# Patient Record
Sex: MALE | Race: WHITE | NOT HISPANIC OR LATINO
[De-identification: names, ages, dates, MRNs, and addresses within clinical notes are randomized per-mention and may not be internally consistent; named-entity substitution may affect disease eponyms.]

---

## 2018-07-07 ENCOUNTER — TRANSCRIPTION ENCOUNTER (OUTPATIENT)
Age: 53
End: 2018-07-07

## 2019-11-07 ENCOUNTER — EMERGENCY (EMERGENCY)
Facility: HOSPITAL | Age: 54
LOS: 0 days | Discharge: HOME | End: 2019-11-07
Attending: EMERGENCY MEDICINE | Admitting: EMERGENCY MEDICINE
Payer: COMMERCIAL

## 2019-11-07 VITALS
SYSTOLIC BLOOD PRESSURE: 136 MMHG | HEART RATE: 68 BPM | OXYGEN SATURATION: 99 % | RESPIRATION RATE: 18 BRPM | TEMPERATURE: 98 F | DIASTOLIC BLOOD PRESSURE: 82 MMHG

## 2019-11-07 VITALS
OXYGEN SATURATION: 98 % | DIASTOLIC BLOOD PRESSURE: 94 MMHG | RESPIRATION RATE: 18 BRPM | HEART RATE: 64 BPM | TEMPERATURE: 97 F | SYSTOLIC BLOOD PRESSURE: 156 MMHG

## 2019-11-07 DIAGNOSIS — R07.9 CHEST PAIN, UNSPECIFIED: ICD-10-CM

## 2019-11-07 DIAGNOSIS — E78.5 HYPERLIPIDEMIA, UNSPECIFIED: ICD-10-CM

## 2019-11-07 DIAGNOSIS — R07.89 OTHER CHEST PAIN: ICD-10-CM

## 2019-11-07 LAB
ALBUMIN SERPL ELPH-MCNC: 4.6 G/DL — SIGNIFICANT CHANGE UP (ref 3.5–5.2)
ALP SERPL-CCNC: 46 U/L — SIGNIFICANT CHANGE UP (ref 30–115)
ALT FLD-CCNC: 36 U/L — SIGNIFICANT CHANGE UP (ref 0–41)
ANION GAP SERPL CALC-SCNC: 11 MMOL/L — SIGNIFICANT CHANGE UP (ref 7–14)
AST SERPL-CCNC: 35 U/L — SIGNIFICANT CHANGE UP (ref 0–41)
BASOPHILS # BLD AUTO: 0.05 K/UL — SIGNIFICANT CHANGE UP (ref 0–0.2)
BASOPHILS NFR BLD AUTO: 0.8 % — SIGNIFICANT CHANGE UP (ref 0–1)
BILIRUB SERPL-MCNC: 0.5 MG/DL — SIGNIFICANT CHANGE UP (ref 0.2–1.2)
BUN SERPL-MCNC: 23 MG/DL — HIGH (ref 10–20)
CALCIUM SERPL-MCNC: 8.8 MG/DL — SIGNIFICANT CHANGE UP (ref 8.5–10.1)
CHLORIDE SERPL-SCNC: 100 MMOL/L — SIGNIFICANT CHANGE UP (ref 98–110)
CO2 SERPL-SCNC: 26 MMOL/L — SIGNIFICANT CHANGE UP (ref 17–32)
CREAT SERPL-MCNC: 1 MG/DL — SIGNIFICANT CHANGE UP (ref 0.7–1.5)
EOSINOPHIL # BLD AUTO: 0.09 K/UL — SIGNIFICANT CHANGE UP (ref 0–0.7)
EOSINOPHIL NFR BLD AUTO: 1.4 % — SIGNIFICANT CHANGE UP (ref 0–8)
GLUCOSE SERPL-MCNC: 100 MG/DL — HIGH (ref 70–99)
HCT VFR BLD CALC: 43.3 % — SIGNIFICANT CHANGE UP (ref 42–52)
HGB BLD-MCNC: 14.6 G/DL — SIGNIFICANT CHANGE UP (ref 14–18)
IMM GRANULOCYTES NFR BLD AUTO: 0.3 % — SIGNIFICANT CHANGE UP (ref 0.1–0.3)
LYMPHOCYTES # BLD AUTO: 1.8 K/UL — SIGNIFICANT CHANGE UP (ref 1.2–3.4)
LYMPHOCYTES # BLD AUTO: 28.8 % — SIGNIFICANT CHANGE UP (ref 20.5–51.1)
MCHC RBC-ENTMCNC: 27.7 PG — SIGNIFICANT CHANGE UP (ref 27–31)
MCHC RBC-ENTMCNC: 33.7 G/DL — SIGNIFICANT CHANGE UP (ref 32–37)
MCV RBC AUTO: 82 FL — SIGNIFICANT CHANGE UP (ref 80–94)
MONOCYTES # BLD AUTO: 0.6 K/UL — SIGNIFICANT CHANGE UP (ref 0.1–0.6)
MONOCYTES NFR BLD AUTO: 9.6 % — HIGH (ref 1.7–9.3)
NEUTROPHILS # BLD AUTO: 3.68 K/UL — SIGNIFICANT CHANGE UP (ref 1.4–6.5)
NEUTROPHILS NFR BLD AUTO: 59.1 % — SIGNIFICANT CHANGE UP (ref 42.2–75.2)
NRBC # BLD: 0 /100 WBCS — SIGNIFICANT CHANGE UP (ref 0–0)
PLATELET # BLD AUTO: 250 K/UL — SIGNIFICANT CHANGE UP (ref 130–400)
POTASSIUM SERPL-MCNC: 4.5 MMOL/L — SIGNIFICANT CHANGE UP (ref 3.5–5)
POTASSIUM SERPL-SCNC: 4.5 MMOL/L — SIGNIFICANT CHANGE UP (ref 3.5–5)
PROT SERPL-MCNC: 7.5 G/DL — SIGNIFICANT CHANGE UP (ref 6–8)
RBC # BLD: 5.28 M/UL — SIGNIFICANT CHANGE UP (ref 4.7–6.1)
RBC # FLD: 12.2 % — SIGNIFICANT CHANGE UP (ref 11.5–14.5)
SODIUM SERPL-SCNC: 137 MMOL/L — SIGNIFICANT CHANGE UP (ref 135–146)
TROPONIN T SERPL-MCNC: <0.01 NG/ML — SIGNIFICANT CHANGE UP
TROPONIN T SERPL-MCNC: <0.01 NG/ML — SIGNIFICANT CHANGE UP
WBC # BLD: 6.24 K/UL — SIGNIFICANT CHANGE UP (ref 4.8–10.8)
WBC # FLD AUTO: 6.24 K/UL — SIGNIFICANT CHANGE UP (ref 4.8–10.8)

## 2019-11-07 PROCEDURE — 99285 EMERGENCY DEPT VISIT HI MDM: CPT

## 2019-11-07 PROCEDURE — 71046 X-RAY EXAM CHEST 2 VIEWS: CPT | Mod: 26

## 2019-11-07 NOTE — ED PROVIDER NOTE - NS ED ROS FT
Constitutional: (-) fever, (-) chills  Eyes: (-) visual changes  ENT: (-) nasal congestions  Cardiovascular: (+) chest pain, (-) syncope  Respiratory: (-) cough, (-) shortness of breath, (-) dyspnea,   Gastrointestinal: (-) vomiting, (-) diarrhea, (-)nausea,  Musculoskeletal: (-) neck pain, (-) back pain, (-) joint pain,  Integumentary: (-) rash, (-) edema, (-) bruises  Neurological: (-) headache, (-) loc, (-) dizziness, (-) tingling, (-)numbness,  Peripheral Vascular: (-) leg swelling  :  (-)dysuria,  (-) hematuria  Allergic/Immunologic: (-) pruritus

## 2019-11-07 NOTE — ED PROVIDER NOTE - OBJECTIVE STATEMENT
53 yo male, pmh of hld, and ccta showed coronary plaque with followed by cards, presents to ed for cp. Midsternal, started three days ago, states pain comes and goes, mild, aching, no otc meds for pain. Denies fever, chills, sob, le swelling, recent travel, recent illness, nv, numbness, tingling. 55 yo male, pmh of hld, and ccta showed coronary plaque with followed by maureen Thomas less than 1 year ago, presents to ed for cp. Midsternal, started three days ago, states pain comes and goes, mild, aching, no otc meds for pain. Denies fever, chills, sob, le swelling, recent travel, recent illness, nv, numbness, tingling.

## 2019-11-07 NOTE — ED ADULT NURSE NOTE - NSFALLRSKASSESASSIST_ED_ALL_ED
Xanax prn, low dose no 

## 2019-11-07 NOTE — ED ADULT NURSE NOTE - CHPI ED NUR SYMPTOMS NEG
no vomiting/no congestion/no dizziness/no nausea/no syncope/no shortness of breath/no chills/no back pain/no diaphoresis/no fever

## 2019-11-07 NOTE — ED PROVIDER NOTE - CARE PROVIDER_API CALL
Bhanu Swift)  Cardiology; Interventional Cardiology  [address], [address]  [place] [address]  Phone: [phone]  Fax: [phone]  Follow Up Time: 1-3 Days

## 2019-11-07 NOTE — ED PROVIDER NOTE - CLINICAL SUMMARY MEDICAL DECISION MAKING FREE TEXT BOX
Pt with CP, PE as above, labs and studies reviewed and d/w Dr Swift, will d/c to f/u as outpatient. Patient counseled regarding conditions which should prompt return.

## 2019-11-07 NOTE — ED PROVIDER NOTE - ATTENDING CONTRIBUTION TO CARE
54y male nonsmoker with 3d intermittent epigastric/right CP, nonexertional, no sob, lasting 40-50 seconds on and off during the day, on exam vital signs appreciated, well appearing, head nc/at, perrla, EOMI, conj pink op clear neck supple cor rrr lungs cta abd snt no c/c/e pulses equal calves nontender neuro intact, will check cxr, ekg, labs, d/w Duvvuri

## 2019-11-07 NOTE — ED PROVIDER NOTE - PHYSICAL EXAMINATION
Physical Exam    Vital Signs: I have reviewed the initial vital signs.  Constitutional: well-nourished, appears stated age, no acute distress  Eyes: Conjunctiva pink, Sclera clear  Cardiovascular: S1 and S2, regular rate, regular rhythm, well-perfused extremities, radial pulses equal and 2+, pedal pulses 2+ and equal.  Respiratory: unlabored respiratory effort, clear to auscultation bilaterally no wheezing, rales and rhonchi  Gastrointestinal: soft, non-tender abdomen, no pulsatile mass, normal bowl sounds  Musculoskeletal: supple neck, no lower extremity edema, no midline tenderness  Integumentary: warm, dry, no rash  Neurologic: awake, alert, nvi, extremities’ motor and sensory functions grossly intact, normal gait.

## 2019-11-07 NOTE — ED PROVIDER NOTE - PATIENT PORTAL LINK FT
You can access the FollowMyHealth Patient Portal offered by [facility] by registering at the following website: [email or web address]. By joining [employer]’s FollowMyHealth portal, you will also be able to view your health information using other applications (apps) compatible with our system.

## 2019-12-09 ENCOUNTER — EMERGENCY (EMERGENCY)
Facility: HOSPITAL | Age: 54
LOS: 0 days | Discharge: HOME | End: 2019-12-09
Attending: EMERGENCY MEDICINE | Admitting: EMERGENCY MEDICINE
Payer: COMMERCIAL

## 2019-12-09 VITALS
OXYGEN SATURATION: 99 % | TEMPERATURE: 99 F | SYSTOLIC BLOOD PRESSURE: 165 MMHG | HEART RATE: 69 BPM | RESPIRATION RATE: 18 BRPM | DIASTOLIC BLOOD PRESSURE: 82 MMHG

## 2019-12-09 DIAGNOSIS — N50.819 TESTICULAR PAIN, UNSPECIFIED: ICD-10-CM

## 2019-12-09 DIAGNOSIS — N50.3 CYST OF EPIDIDYMIS: ICD-10-CM

## 2019-12-09 DIAGNOSIS — R31.9 HEMATURIA, UNSPECIFIED: ICD-10-CM

## 2019-12-09 PROBLEM — E78.5 HYPERLIPIDEMIA, UNSPECIFIED: Chronic | Status: ACTIVE | Noted: 2019-11-07

## 2019-12-09 LAB
APPEARANCE UR: CLEAR — SIGNIFICANT CHANGE UP
BILIRUB UR-MCNC: NEGATIVE — SIGNIFICANT CHANGE UP
COLOR SPEC: SIGNIFICANT CHANGE UP
DIFF PNL FLD: SIGNIFICANT CHANGE UP
GLUCOSE UR QL: NEGATIVE — SIGNIFICANT CHANGE UP
KETONES UR-MCNC: NEGATIVE — SIGNIFICANT CHANGE UP
LEUKOCYTE ESTERASE UR-ACNC: NEGATIVE — SIGNIFICANT CHANGE UP
NITRITE UR-MCNC: NEGATIVE — SIGNIFICANT CHANGE UP
PH UR: 6.5 — SIGNIFICANT CHANGE UP (ref 5–8)
PROT UR-MCNC: NEGATIVE — SIGNIFICANT CHANGE UP
SP GR SPEC: 1.02 — SIGNIFICANT CHANGE UP (ref 1.01–1.02)
UROBILINOGEN FLD QL: SIGNIFICANT CHANGE UP

## 2019-12-09 PROCEDURE — 99284 EMERGENCY DEPT VISIT MOD MDM: CPT

## 2019-12-09 PROCEDURE — 76870 US EXAM SCROTUM: CPT | Mod: 26

## 2019-12-09 NOTE — ED PROVIDER NOTE - OBJECTIVE STATEMENT
54 y.o male w/ hx of HLD, CKD presents to the ED for evaluation of right testicular pain x 3 weeks.  For past 3 weeks intermittent R testicular pain, achy, no alleviating/ exacerbating factors, no radiation of pain, mild severity.  Denies diarrhea, constipation, urinary sxs, fever, chills, back pain. sexually active with one partner.  no concern for STIs.

## 2019-12-09 NOTE — ED PROVIDER NOTE - CARE PROVIDERS DIRECT ADDRESSES
[email or web address]

## 2019-12-09 NOTE — ED PROVIDER NOTE - CARE PLAN
Principal Discharge DX:	Epididymal cyst Principal Discharge DX:	Epididymal cyst  Secondary Diagnosis:	Hematuria

## 2019-12-09 NOTE — ED PROVIDER NOTE - NSFOLLOWUPINSTRUCTIONS_ED_ALL_ED_FT
Spermatocele    A spermatocele is a fluid-filled sac (cyst) inside the scrotum. This type of cyst often forms at the top of the testicle where sperm is stored (epididymis). The cyst sometimes forms along the tube that carries sperm away from the epididymis (vas deferens).     Spermatoceles are usually painless. Most cysts are small, but they can grow larger. Spermatoceles are not cancerous (are benign).     CAUSES  The cause of this condition is not known.    SYMPTOMS  In most cases, small cysts do not cause symptoms. If you do have symptoms, they may include:    Dull pain.  A feeling of heaviness.  An enlargement of your scrotum, if the cyst is large.    DIAGNOSIS  This condition is diagnosed based on a physical exam. You or your health care provider may notice the cyst when feeling your scrotum. Your provider may shine a light through (transilluminate) your scrotum to see if light will pass through the cyst. You may also have an ultrasound of your scrotum to rule out a tumor.    TREATMENT  Small spermatoceles do not need to be treated. If the spermatocele has grown large or is uncomfortable, surgery to remove the cyst may be recommended.    HOME CARE INSTRUCTIONS  Watch your spermatocele for any changes.  Keep all follow-up visits as told by your health care provider. This is important.    SEEK MEDICAL CARE IF:  Your spermatocele gets larger.  You have pain in your scrotum.  Your spermatocele comes back after treatment.    ADDITIONAL NOTES AND INSTRUCTIONS    Please follow up with your Primary MD in 24-48 hr.  Seek immediate medical care for any new/worsening signs or symptoms.     Follow up with your primary medical doctor in 1-2 days

## 2019-12-09 NOTE — ED PROVIDER NOTE - CLINICAL SUMMARY MEDICAL DECISION MAKING FREE TEXT BOX
pt with intemittent testicular pain, sono done showing cyst. no torsion or epidymititis, pt comftorable with outpt urology f/u. pt also with trace blood in urine. pt aware of need for f/u with this.

## 2019-12-09 NOTE — ED ADULT NURSE NOTE - NSIMPLEMENTINTERV_GEN_ALL_ED
Implemented All Universal Safety Interventions:  [place] to call system. Call bell, personal items and telephone within reach. Instruct patient to call for assistance. Room bathroom lighting operational. Non-slip footwear when patient is off stretcher. Physically safe environment: no spills, clutter or unnecessary equipment. Stretcher in lowest position, wheels locked, appropriate side rails in place.

## 2019-12-09 NOTE — ED PROVIDER NOTE - PHYSICAL EXAMINATION
CONST: Well appearing in NAD  EYES: Sclera and conjunctiva clear.  CARD: Normal S1 S2; Normal rate and rhythm  RESP: Equal BS B/L, No wheezes, rhonchi or rales. No distress  GI: Soft, non-tender, non-distended.  : + mild TTP right epididymis, no penile discharge, normal testicular lie, normal cremasteric reflex, no direct/indirect hernias, no inguinal tenderness  MS: Normal ROM in all extremities. No edema of lower extremities, no calf pain, radial pulses 2+ bilaterally  SKIN: Warm, dry, no acute rashes. Good turgor  NEURO: A&Ox3, No focal deficits. Strength 5/5 with no sensory deficits. Steady gait

## 2019-12-09 NOTE — ED ADULT NURSE NOTE - OBJECTIVE STATEMENT
patient alert and oriented x4, complaining of testicular pain for three weeks. denies swelling in testicles, fever, nausea, vomiting, diarrhea.

## 2019-12-09 NOTE — ED PROVIDER NOTE - CARE PROVIDER_API CALL
Caesar Gates)  Urology  [address] [place] [address]  Phone: [phone]  Fax: [phone]  Follow Up Time: 1-3 Days

## 2019-12-09 NOTE — ED PROVIDER NOTE - PATIENT PORTAL LINK FT
You can access the FollowMyHealth Patient Portal offered by [facility] by registering at the following website: [email or web address]. By joining [employer]’s FollowMyHealth portal, you will also be able to view your health information using other applications (apps) compatible with our system.

## 2019-12-09 NOTE — ED PROVIDER NOTE - NS ED ROS FT
Constitutional: See HPI.  Eyes: No visual changes, eye pain or discharge.   ENMT: No hearing changes, pain, discharge or infections.   Cardiac: No SOB or edema. No chest pain with exertion.  Respiratory: No cough or respiratory distress.   GI: No nausea, vomiting, diarrhea or abdominal pain.  : + testicular pain. No dysuria, frequency or burning. No Discharge  MS: No myalgia, muscle weakness, joint pain or back pain.  Neuro: No headache or weakness.   Skin: No skin rash.  Except as documented in the HPI, all other systems are negative.

## 2019-12-09 NOTE — ED PROVIDER NOTE - PROGRESS NOTE DETAILS
Attending Note: I personally evaluated the patient. I reviewed the Resident’s note (as assigned above), and agree with the findings and plan except as documented in my note. 55 y/o M presents for intermittent testicular pain. Pt has pain to his left testicle intermittently that is not associated with dysuria or discharge. Pt states he is only sexually active with his wife. Pt is also c/o right inguinal pain with no radiation. No fever or chills. PE: Well appearing, awake and alert. Cardio – S1S2. Resp - CTAB. Abdomen – soft, NTND. : B/l cremasteric reflex. Left: tenderness over epididymitis. No inguinal hernia appreciated.  Ext- no calf swelling. Neuro – grossly unremarkable Discussed results with pt.  All questions were answered and return precautions discussed.  Pt is asx and comfortable at this time.  Unremarkable re-exam.  No further concerns at this time from pt.  Will follow up with PMD.  Pt understands and agrees with tx plan.  discussed incidental findings. Attending Note: I personally evaluated the patient. I reviewed the Resident’s note (as assigned above), and agree with the findings and plan except as documented in my note. 55 y/o M presents for intermittent testicular pain. Pt has pain to his left testicle intermittently that is not associated with dysuria or discharge. Pt states he is only sexually active with his wife. Pt is also c/o right inguinal pain with no radiation. No fever or chills. PE: Well appearing, awake and alert. Cardio – S1S2. Resp - CTAB. Abdomen – soft, NTND. : B/l cremasteric reflex. Left: tenderness over epididymitis. No inguinal hernia appreciated BL.  Ext- no calf swelling. Neuro – grossly unremarkable Discussed results with pt.  All questions were answered and return precautions discussed.  Pt is asx and comfortable at this time.  Unremarkable re-exam.  No further concerns at this time from pt.  Will follow up with PMD and urology.  Pt understands and agrees with tx plan.  discussed incidental findings.

## 2019-12-10 ENCOUNTER — APPOINTMENT (OUTPATIENT)
Dept: UROLOGY | Facility: CLINIC | Age: 54
End: 2019-12-10
Payer: COMMERCIAL

## 2019-12-10 DIAGNOSIS — Z78.9 OTHER SPECIFIED HEALTH STATUS: ICD-10-CM

## 2019-12-10 DIAGNOSIS — Z86.79 PERSONAL HISTORY OF OTHER DISEASES OF THE CIRCULATORY SYSTEM: ICD-10-CM

## 2019-12-10 DIAGNOSIS — N44.2 BENIGN CYST OF TESTIS: ICD-10-CM

## 2019-12-10 DIAGNOSIS — Z86.39 PERSONAL HISTORY OF OTHER ENDOCRINE, NUTRITIONAL AND METABOLIC DISEASE: ICD-10-CM

## 2019-12-10 LAB
C TRACH RRNA SPEC QL NAA+PROBE: SIGNIFICANT CHANGE UP
N GONORRHOEA RRNA SPEC QL NAA+PROBE: SIGNIFICANT CHANGE UP
SPECIMEN SOURCE: SIGNIFICANT CHANGE UP

## 2019-12-10 PROCEDURE — 99204 OFFICE O/P NEW MOD 45 MIN: CPT

## 2019-12-10 NOTE — ASSESSMENT
[FreeTextEntry1] : #1. BPH, clinically\par #2. Left epididymal cyst == based on ultrasound imaging and physical exam\par \par Plan\par -PSA now\par -Annual STEVO and PSA

## 2019-12-10 NOTE — PHYSICAL EXAM
[General Appearance - Well Nourished] : well nourished [General Appearance - Well Developed] : well developed [Well Groomed] : well groomed [Normal Appearance] : normal appearance [General Appearance - In No Acute Distress] : no acute distress [Respiration, Rhythm And Depth] : normal respiratory rhythm and effort [Edema] : no peripheral edema [Abdomen Tenderness] : non-tender [Exaggerated Use Of Accessory Muscles For Inspiration] : no accessory muscle use [Abdomen Soft] : soft [Urethral Meatus] : meatus normal [Penis Abnormality] : normal circumcised penis [Costovertebral Angle Tenderness] : no ~M costovertebral angle tenderness [Testes Tenderness] : no tenderness of the testes [Scrotum] : the scrotum was normal [Testes Mass (___cm)] : there were no testicular masses [Prostate Tenderness] : the prostate was not tender [No Prostate Nodules] : no prostate nodules [Prostate Size ___ gm] : prostate size [unfilled] gm [Normal Station and Gait] : the gait and station were normal for the patient's age [] : no rash [Oriented To Time, Place, And Person] : oriented to person, place, and time [Affect] : the affect was normal [No Focal Deficits] : no focal deficits [Not Anxious] : not anxious [No Palpable Adenopathy] : no palpable adenopathy [Mood] : the mood was normal [FreeTextEntry1] : sulcus ++ // small mass above left  testis , sl. tender

## 2019-12-10 NOTE — HISTORY OF PRESENT ILLNESS
[FreeTextEntry1] : 54-year-old male here for initial consultation regarding left epididymal cyst. Patient reported feeling a small tender mass in his left testicle yesterday and therefore went to emergency room. Testicular ultrasound suggested a left epididymal cyst. Patient here for consultation following that visit.\par Urinalysis today== negative [Urinary Urgency] : urinary urgency [Urinary Frequency] : urinary frequency [Nocturia] : nocturia [Straining] : no straining [Weak Stream] : no weak stream [Dysuria] : no dysuria [Fatigue] : no fatigue [Hematuria - Gross] : no gross hematuria [Anorexia] : no anorexia

## 2019-12-15 ENCOUNTER — TRANSCRIPTION ENCOUNTER (OUTPATIENT)
Age: 54
End: 2019-12-15

## 2020-02-11 ENCOUNTER — TRANSCRIPTION ENCOUNTER (OUTPATIENT)
Age: 55
End: 2020-02-11

## 2020-12-10 ENCOUNTER — APPOINTMENT (OUTPATIENT)
Dept: UROLOGY | Facility: CLINIC | Age: 55
End: 2020-12-10
Payer: COMMERCIAL

## 2020-12-10 VITALS — WEIGHT: 170 LBS | TEMPERATURE: 98.6 F | HEIGHT: 68 IN | BODY MASS INDEX: 25.76 KG/M2

## 2020-12-10 LAB
BILIRUB UR QL STRIP: NORMAL
CLARITY UR: CLEAR
COLLECTION METHOD: NORMAL
GLUCOSE UR-MCNC: NORMAL
HCG UR QL: NORMAL EU/DL
HGB UR QL STRIP.AUTO: NORMAL
KETONES UR-MCNC: NORMAL
LEUKOCYTE ESTERASE UR QL STRIP: NORMAL
NITRITE UR QL STRIP: NORMAL
PH UR STRIP: 6
PROT UR STRIP-MCNC: NORMAL
SP GR UR STRIP: 1.01

## 2020-12-10 PROCEDURE — 99072 ADDL SUPL MATRL&STAF TM PHE: CPT

## 2020-12-10 PROCEDURE — 81003 URINALYSIS AUTO W/O SCOPE: CPT | Mod: QW

## 2020-12-10 PROCEDURE — 99214 OFFICE O/P EST MOD 30 MIN: CPT

## 2020-12-10 NOTE — HISTORY OF PRESENT ILLNESS
[Urinary Urgency] : urinary urgency [Urinary Frequency] : urinary frequency [Nocturia] : nocturia [FreeTextEntry1] : 55 year-old male here  regarding left epididymal cyst. Patient reported feeling a small  mass in his left testicle  now painless. Testicular ultrasound suggested a left epididymal cyst. Patient here for consultation following that visit.\par Urinalysis today== negative\par \par 8/20 psa = .5  // bun = 15  creat = 1.0 [Straining] : no straining [Weak Stream] : no weak stream [Dysuria] : no dysuria [Hematuria - Gross] : no gross hematuria [Fatigue] : no fatigue [Anorexia] : no anorexia

## 2020-12-10 NOTE — ASSESSMENT
[FreeTextEntry1] : #1. BPH, clinically\par #2. Left epididymal cyst == based on ultrasound imaging and physical exam\par \par Plan\par -Annual STEVO and PSA

## 2020-12-10 NOTE — PHYSICAL EXAM
[General Appearance - Well Developed] : well developed [General Appearance - Well Nourished] : well nourished [Normal Appearance] : normal appearance [Well Groomed] : well groomed [General Appearance - In No Acute Distress] : no acute distress [Abdomen Soft] : soft [Abdomen Tenderness] : non-tender [Costovertebral Angle Tenderness] : no ~M costovertebral angle tenderness [Urethral Meatus] : meatus normal [Penis Abnormality] : normal circumcised penis [Scrotum] : the scrotum was normal [Testes Tenderness] : no tenderness of the testes [Testes Mass (___cm)] : there were no testicular masses [Prostate Tenderness] : the prostate was not tender [No Prostate Nodules] : no prostate nodules [Prostate Size ___ gm] : prostate size [unfilled] gm [Edema] : no peripheral edema [] : no respiratory distress [Respiration, Rhythm And Depth] : normal respiratory rhythm and effort [Exaggerated Use Of Accessory Muscles For Inspiration] : no accessory muscle use [Oriented To Time, Place, And Person] : oriented to person, place, and time [Affect] : the affect was normal [Mood] : the mood was normal [Not Anxious] : not anxious [Normal Station and Gait] : the gait and station were normal for the patient's age [No Focal Deficits] : no focal deficits [No Palpable Adenopathy] : no palpable adenopathy [FreeTextEntry1] : sulcus ++ // small mass above left  testis

## 2021-02-24 ENCOUNTER — TRANSCRIPTION ENCOUNTER (OUTPATIENT)
Age: 56
End: 2021-02-24

## 2021-02-24 ENCOUNTER — EMERGENCY (EMERGENCY)
Facility: HOSPITAL | Age: 56
LOS: 0 days | Discharge: HOME | End: 2021-02-25
Attending: EMERGENCY MEDICINE | Admitting: EMERGENCY MEDICINE
Payer: COMMERCIAL

## 2021-02-24 VITALS
SYSTOLIC BLOOD PRESSURE: 166 MMHG | HEIGHT: 68 IN | HEART RATE: 63 BPM | WEIGHT: 169.98 LBS | RESPIRATION RATE: 18 BRPM | TEMPERATURE: 98 F | DIASTOLIC BLOOD PRESSURE: 93 MMHG | OXYGEN SATURATION: 100 %

## 2021-02-24 DIAGNOSIS — I25.10 ATHEROSCLEROTIC HEART DISEASE OF NATIVE CORONARY ARTERY WITHOUT ANGINA PECTORIS: ICD-10-CM

## 2021-02-24 DIAGNOSIS — R07.9 CHEST PAIN, UNSPECIFIED: ICD-10-CM

## 2021-02-24 DIAGNOSIS — Z20.822 CONTACT WITH AND (SUSPECTED) EXPOSURE TO COVID-19: ICD-10-CM

## 2021-02-24 DIAGNOSIS — R91.1 SOLITARY PULMONARY NODULE: ICD-10-CM

## 2021-02-24 DIAGNOSIS — R07.89 OTHER CHEST PAIN: ICD-10-CM

## 2021-02-24 PROCEDURE — 93010 ELECTROCARDIOGRAM REPORT: CPT

## 2021-02-24 PROCEDURE — 99285 EMERGENCY DEPT VISIT HI MDM: CPT

## 2021-02-24 RX ORDER — ASPIRIN/CALCIUM CARB/MAGNESIUM 324 MG
162 TABLET ORAL ONCE
Refills: 0 | Status: COMPLETED | OUTPATIENT
Start: 2021-02-24 | End: 2021-02-24

## 2021-02-24 RX ADMIN — Medication 162 MILLIGRAM(S): at 23:06

## 2021-02-24 NOTE — ED ADULT NURSE NOTE - OBJECTIVE STATEMENT
pt aox4 complaining of chest pain for the past few days. ekg done, iv placed / labs sent. VSS. Will continue to monitor / assess

## 2021-02-24 NOTE — ED ADULT NURSE NOTE - NSIMPLEMENTINTERV_GEN_ALL_ED
Implemented All Universal Safety Interventions:  [place] to call system. Call bell, personal items and telephone within reach. Instruct patient to call for assistance. Room bathroom lighting operational. Non-slip footwear when patient is off stretcher. Physically safe environment: no spills, clutter or unnecessary equipment. Stretcher in lowest position, wheels locked, appropriate side rails in place.

## 2021-02-25 ENCOUNTER — APPOINTMENT (OUTPATIENT)
Dept: SURGERY | Facility: CLINIC | Age: 56
End: 2021-02-25

## 2021-02-25 VITALS — OXYGEN SATURATION: 100 % | TEMPERATURE: 98 F | RESPIRATION RATE: 18 BRPM | HEART RATE: 60 BPM

## 2021-02-25 LAB
ALBUMIN SERPL ELPH-MCNC: 4.8 G/DL — SIGNIFICANT CHANGE UP (ref 3.5–5.2)
ALP SERPL-CCNC: 53 U/L — SIGNIFICANT CHANGE UP (ref 30–115)
ALT FLD-CCNC: 21 U/L — SIGNIFICANT CHANGE UP (ref 0–41)
ANION GAP SERPL CALC-SCNC: 12 MMOL/L — SIGNIFICANT CHANGE UP (ref 7–14)
AST SERPL-CCNC: 21 U/L — SIGNIFICANT CHANGE UP (ref 0–41)
BASOPHILS # BLD AUTO: 0.04 K/UL — SIGNIFICANT CHANGE UP (ref 0–0.2)
BASOPHILS NFR BLD AUTO: 0.4 % — SIGNIFICANT CHANGE UP (ref 0–1)
BILIRUB SERPL-MCNC: 0.5 MG/DL — SIGNIFICANT CHANGE UP (ref 0.2–1.2)
BUN SERPL-MCNC: 22 MG/DL — HIGH (ref 10–20)
CALCIUM SERPL-MCNC: 9.4 MG/DL — SIGNIFICANT CHANGE UP (ref 8.5–10.1)
CHLORIDE SERPL-SCNC: 102 MMOL/L — SIGNIFICANT CHANGE UP (ref 98–110)
CO2 SERPL-SCNC: 24 MMOL/L — SIGNIFICANT CHANGE UP (ref 17–32)
CREAT SERPL-MCNC: 1 MG/DL — SIGNIFICANT CHANGE UP (ref 0.7–1.5)
EOSINOPHIL # BLD AUTO: 0.06 K/UL — SIGNIFICANT CHANGE UP (ref 0–0.7)
EOSINOPHIL NFR BLD AUTO: 0.7 % — SIGNIFICANT CHANGE UP (ref 0–8)
GLUCOSE SERPL-MCNC: 113 MG/DL — HIGH (ref 70–99)
HCT VFR BLD CALC: 44.9 % — SIGNIFICANT CHANGE UP (ref 42–52)
HGB BLD-MCNC: 15.8 G/DL — SIGNIFICANT CHANGE UP (ref 14–18)
IMM GRANULOCYTES NFR BLD AUTO: 0.3 % — SIGNIFICANT CHANGE UP (ref 0.1–0.3)
LYMPHOCYTES # BLD AUTO: 1.7 K/UL — SIGNIFICANT CHANGE UP (ref 1.2–3.4)
LYMPHOCYTES # BLD AUTO: 19.1 % — LOW (ref 20.5–51.1)
MAGNESIUM SERPL-MCNC: 2.3 MG/DL — SIGNIFICANT CHANGE UP (ref 1.8–2.4)
MCHC RBC-ENTMCNC: 28.8 PG — SIGNIFICANT CHANGE UP (ref 27–31)
MCHC RBC-ENTMCNC: 35.2 G/DL — SIGNIFICANT CHANGE UP (ref 32–37)
MCV RBC AUTO: 81.9 FL — SIGNIFICANT CHANGE UP (ref 80–94)
MONOCYTES # BLD AUTO: 0.65 K/UL — HIGH (ref 0.1–0.6)
MONOCYTES NFR BLD AUTO: 7.3 % — SIGNIFICANT CHANGE UP (ref 1.7–9.3)
NEUTROPHILS # BLD AUTO: 6.42 K/UL — SIGNIFICANT CHANGE UP (ref 1.4–6.5)
NEUTROPHILS NFR BLD AUTO: 72.2 % — SIGNIFICANT CHANGE UP (ref 42.2–75.2)
NRBC # BLD: 0 /100 WBCS — SIGNIFICANT CHANGE UP (ref 0–0)
PLATELET # BLD AUTO: 249 K/UL — SIGNIFICANT CHANGE UP (ref 130–400)
POTASSIUM SERPL-MCNC: 4.4 MMOL/L — SIGNIFICANT CHANGE UP (ref 3.5–5)
POTASSIUM SERPL-SCNC: 4.4 MMOL/L — SIGNIFICANT CHANGE UP (ref 3.5–5)
PROT SERPL-MCNC: 7.8 G/DL — SIGNIFICANT CHANGE UP (ref 6–8)
RAPID RVP RESULT: SIGNIFICANT CHANGE UP
RBC # BLD: 5.48 M/UL — SIGNIFICANT CHANGE UP (ref 4.7–6.1)
RBC # FLD: 12.8 % — SIGNIFICANT CHANGE UP (ref 11.5–14.5)
SARS-COV-2 RNA SPEC QL NAA+PROBE: SIGNIFICANT CHANGE UP
SODIUM SERPL-SCNC: 138 MMOL/L — SIGNIFICANT CHANGE UP (ref 135–146)
TROPONIN T SERPL-MCNC: <0.01 NG/ML — SIGNIFICANT CHANGE UP
TROPONIN T SERPL-MCNC: <0.01 NG/ML — SIGNIFICANT CHANGE UP
WBC # BLD: 8.9 K/UL — SIGNIFICANT CHANGE UP (ref 4.8–10.8)
WBC # FLD AUTO: 8.9 K/UL — SIGNIFICANT CHANGE UP (ref 4.8–10.8)

## 2021-02-25 PROCEDURE — 99220: CPT

## 2021-02-25 PROCEDURE — 76705 ECHO EXAM OF ABDOMEN: CPT | Mod: 26

## 2021-02-25 PROCEDURE — 75574 CT ANGIO HRT W/3D IMAGE: CPT | Mod: 26

## 2021-02-25 PROCEDURE — 93010 ELECTROCARDIOGRAM REPORT: CPT

## 2021-02-25 PROCEDURE — 71046 X-RAY EXAM CHEST 2 VIEWS: CPT | Mod: 26

## 2021-02-25 RX ORDER — METOPROLOL TARTRATE 50 MG
5 TABLET ORAL ONCE
Refills: 0 | Status: DISCONTINUED | OUTPATIENT
Start: 2021-02-25 | End: 2021-02-25

## 2021-02-25 NOTE — ED CDU PROVIDER DISPOSITION NOTE - CARE PROVIDER_API CALL
Bhanu Swift  CARDIOLOGY  [address], [address]  [place] [address]  Phone: [phone]  Fax: [phone]  Established Patient  Follow Up Time: 7-10 Days

## 2021-02-25 NOTE — ED CDU PROVIDER INITIAL DAY NOTE - PHYSICAL EXAMINATION
Physical Exam    Vital Signs: I have reviewed the initial vital signs.  Constitutional: well-nourished, appears stated age, no acute distress  Eyes: Conjunctiva pink, Sclera clear  Cardiovascular: S1 and S2, regular rate, regular rhythm, well-perfused extremities, radial pulses equal and 2+ b/l.   Respiratory: unlabored respiratory effort, clear to auscultation bilaterally no wheezing, rales and rhonchi. pt is speaking full sentences. no accessory muscle use. no reproducible chest tenderness or chest wall crepitus.    Gastrointestinal: soft, non-tender, nondistended abdomen, no pulsatile mass, normal bowl sounds, no rebound, no guarding  Musculoskeletal: supple neck, no lower extremity edema, no calf tenderness  Integumentary: warm, dry, no rash  Neurologic: awake, alert  Psychiatric: appropriate mood, appropriate affect

## 2021-02-25 NOTE — ED PROVIDER NOTE - OBJECTIVE STATEMENT
56 y/o male with a PMH of HTN and HLD (on amlodipine 5mg, rosuvastatin 10mg every other day, and baby aspirin every other day) presents to the ED for evaluation of intermittent nonradiating right side chest pain x 3 days. pt reports when he experiences the pain is lasts a couple of seconds. pt denies alleviating or worsening factors. pt reports he follows cardiologist Dr. Ibanez and was seen about 1 and a half months ago. pt reports drinking alcohol frequently. pt denies hx of mi or blood clot, fhx of mi, illicit drug use, cigarette smoking, fever, cough, recent viral illness, palpitations, rashes, abdominal pain, back pain, n/v/d/c, back pain, sweating, leg pain, leg swelling, recent trauma, recent travel, pleuritic chest pain, numbness, tingling,or dizziness.

## 2021-02-25 NOTE — ED PROVIDER NOTE - CARE PLAN
Principal Discharge DX:	Chest pain   Principal Discharge DX:	Chest pain  Secondary Diagnosis:	Coronary artery disease  Secondary Diagnosis:	Pulmonary nodule

## 2021-02-25 NOTE — ED CDU PROVIDER DISPOSITION NOTE - PATIENT PORTAL LINK FT
You can access the FollowMyHealth Patient Portal offered by [facility] by registering at the following website: [email or web address]. By joining [employer]’s FollowMyHealth portal, you will also be able to view your health information using other applications (apps) compatible with our system.

## 2021-02-25 NOTE — ED PROVIDER NOTE - ATTENDING CONTRIBUTION TO CARE
I personally evaluated the patient. I reviewed the Resident’s or Physician Assistant’s note (as assigned above), and agree with the findings and plan except as documented in my note.  55 yr M with hx of HTN, HLD with complaints of about 5 days of right sided CP that radiates to the right upper quadrant. No associated fever, nausea, vomiting, coughing, SOB. Pt states that pain is intermittent. No hx of abd surgeries. Sees Dr. Martinez, cardiology and had a normal stress test "many years ago". VS reviewed, pt non-toxic appearing, NAD. Head ncat, MMM, neck supple, normal ROM, normal s1s2 without any murmurs, Lungs CTAB with normal work of breathing. abd +BS, s/nd, + ttp of the RUQ w/o guarding or rebound, no CVAT b/l, extremities wnl, neuro exam grossly normal. No acute skin rashes. Plan is EKG, labs, imaging and reassess.

## 2021-02-25 NOTE — ED CDU PROVIDER DISPOSITION NOTE - NSFOLLOWUPINSTRUCTIONS_ED_ALL_ED_FT
1. Follow up with Dr Swift within 2 weeks.  2. Continue your medications.  3. Share results of your ED testing with Dr Swift and with your primary physician.    Chest Pain    Chest pain can be caused by many different conditions which may or may not be dangerous. Causes include heartburn, lung infections, heart attack, blood clot in lungs, skin infections, strain or damage to muscle, cartilage, or bones, etc. In addition to a history and physical examination, an electrocardiogram (ECG) or other lab tests may have been performed to determine the cause of your chest pain. Follow up with your primary care provider or with a cardiologist as instructed.     SEEK IMMEDIATE MEDICAL CARE IF YOU HAVE ANY OF THE FOLLOWING SYMPTOMS: worsening chest pain, coughing up blood, unexplained back/neck/jaw pain, severe abdominal pain, dizziness or lightheadedness, fainting, shortness of breath, sweaty or clammy skin, vomiting, or racing heart beat. These symptoms may represent a serious problem that is an emergency. Do not wait to see if the symptoms will go away. Get medical help right away. Call 911 and do not drive yourself to the hospital.

## 2021-02-25 NOTE — ED CDU PROVIDER DISPOSITION NOTE - CLINICAL COURSE
56yo man h/o HTN, HLD was placed in CDU for workup of chest pain, R sided, intermittent, nonexertional x 3 days. ED workup was unremarkable. VS, exam as noted, pt comfortable and nontoxic appearing on my eval. Pt was monitored without incident, repeat EKG/enzymes unchanged. CCTA was CAD-RAD 1 for minimal stenosis due to scattered plaque. Pt already takes ASA and statin. Incidental finding of pulmonary nodule was discussed with pt, he will f/u with PMD and Dr Swift.

## 2021-02-25 NOTE — ED CDU PROVIDER INITIAL DAY NOTE - MEDICAL DECISION MAKING DETAILS
54yo man h/o HTN, HLD was placed in CDU for workup of chest pain, R sided, intermittent, nonexertional x 3 days. ED workup was unremarkable. VS, exam as noted, pt comfortable and nontoxic appearing on my eval. Plan is for telemetry, serial EKG/enzymes, CCTA.

## 2021-02-25 NOTE — ED CDU PROVIDER INITIAL DAY NOTE - ATTENDING CONTRIBUTION TO CARE
56yo man h/o HTN, HLD was placed in CDU for workup of chest pain, R sided, intermittent, nonexertional x 3 days. ED workup was unremarkable. VS, exam as noted, pt comfortable and nontoxic appearing on my eval. Plan is for telemetry, serial EKG/enzymes, CCTA.

## 2021-02-25 NOTE — ED PROVIDER NOTE - PHYSICAL EXAMINATION
Physical Exam    Vital Signs: I have reviewed the initial vital signs.  Constitutional: well-nourished, appears stated age, no acute distress  Eyes: Conjunctiva pink, Sclera clear  Cardiovascular: S1 and S2, regular rate, regular rhythm, well-perfused extremities, radial pulses equal and 2+ b/l.   Respiratory: unlabored respiratory effort, clear to auscultation bilaterally no wheezing, rales and rhonchi. pt is speaking full sentences. no accessory muscle use. no reproducible chest tenderness or chest wall crepitus.   Gastrointestinal: soft, non-tender, nondistended abdomen, no pulsatile mass, normal bowl sounds, no rebound, no guarding, no organomegaly.   Musculoskeletal: supple neck, no lower extremity edema, no calf tenderness  Integumentary: warm, dry, no rash  Neurologic: awake, alert  Psychiatric: appropriate mood, appropriate affect

## 2021-03-01 PROBLEM — I10 ESSENTIAL (PRIMARY) HYPERTENSION: Chronic | Status: ACTIVE | Noted: 2021-02-25

## 2021-03-03 ENCOUNTER — APPOINTMENT (OUTPATIENT)
Dept: SURGERY | Facility: CLINIC | Age: 56
End: 2021-03-03
Payer: COMMERCIAL

## 2021-03-03 VITALS
TEMPERATURE: 97.9 F | BODY MASS INDEX: 25.31 KG/M2 | WEIGHT: 167 LBS | HEART RATE: 77 BPM | HEIGHT: 68 IN | SYSTOLIC BLOOD PRESSURE: 140 MMHG | DIASTOLIC BLOOD PRESSURE: 90 MMHG

## 2021-03-03 PROCEDURE — 99202 OFFICE O/P NEW SF 15 MIN: CPT

## 2021-03-03 PROCEDURE — 99072 ADDL SUPL MATRL&STAF TM PHE: CPT

## 2021-03-15 ENCOUNTER — APPOINTMENT (OUTPATIENT)
Dept: GASTROENTEROLOGY | Facility: CLINIC | Age: 56
End: 2021-03-15
Payer: COMMERCIAL

## 2021-03-15 DIAGNOSIS — R07.9 CHEST PAIN, UNSPECIFIED: ICD-10-CM

## 2021-03-15 DIAGNOSIS — K21.9 GASTRO-ESOPHAGEAL REFLUX DISEASE W/OUT ESOPHAGITIS: ICD-10-CM

## 2021-03-15 PROCEDURE — 99204 OFFICE O/P NEW MOD 45 MIN: CPT | Mod: 95

## 2021-03-15 RX ORDER — FAMOTIDINE 40 MG/1
40 TABLET, FILM COATED ORAL
Qty: 30 | Refills: 1 | Status: ACTIVE | COMMUNITY
Start: 2021-03-15 | End: 1900-01-01

## 2021-03-15 RX ORDER — TIZANIDINE 4 MG/1
4 TABLET ORAL
Qty: 42 | Refills: 0 | Status: DISCONTINUED | COMMUNITY
Start: 2019-10-22 | End: 2021-03-15

## 2021-03-15 RX ORDER — MELOXICAM 15 MG/1
15 TABLET ORAL
Qty: 21 | Refills: 0 | Status: DISCONTINUED | COMMUNITY
Start: 2019-10-22 | End: 2021-03-15

## 2021-03-15 RX ORDER — ATORVASTATIN CALCIUM 40 MG/1
40 TABLET, FILM COATED ORAL
Qty: 90 | Refills: 0 | Status: DISCONTINUED | COMMUNITY
Start: 2019-05-17 | End: 2021-03-15

## 2021-03-16 PROBLEM — K21.9 MILD ACID REFLUX: Status: ACTIVE | Noted: 2021-03-15

## 2021-03-16 RX ORDER — AMLODIPINE BESYLATE 5 MG/1
TABLET ORAL
Refills: 0 | Status: ACTIVE | COMMUNITY

## 2021-03-16 RX ORDER — ROSUVASTATIN CALCIUM 10 MG/1
10 TABLET, FILM COATED ORAL
Refills: 0 | Status: ACTIVE | COMMUNITY

## 2021-03-16 NOTE — HISTORY OF PRESENT ILLNESS
[Home] : at home, [unfilled] , at the time of the visit. [Medical Office: ([facility])___] : at the medical office located in  [Verbal consent obtained from patient] : the patient, [unfilled] [FreeTextEntry4] : Malorie Ferguson [de-identified] : 55 y/o M presents for acid reflux. \par \par 2/24/21 [facility] ED visit for chest pain. Cardio w/u was negative. History of reflux. Takes Tums PRN, however no relief as of recently.  \par \par Pt reports symptoms are worse with spicy foods and if he lies down after a meal. CT in the emergency room showed a large hiatal hernia. He was prescribed prilosec but he stopped taking it because it make his stomach hurt. Reports occasional dysphagia to solid food.

## 2021-03-16 NOTE — ASSESSMENT
[FreeTextEntry1] : Chest pain/GERD\par - plan for EGD, need cardiac clearance\par - start pepcid prn

## 2021-03-16 NOTE — PHYSICAL EXAM
[General Appearance - Alert] : alert [General Appearance - Well Nourished] : well nourished [Sclera] : the sclera and conjunctiva were normal [Extraocular Movements] : extraocular movements were intact [Outer Ear] : the ears and nose were normal in appearance [Hearing Threshold Finger Rub Not [place]] : hearing was normal [Neck Appearance] : the appearance of the neck was normal [Neck Cervical Mass (___cm)] : no neck mass was observed [Exaggerated Use Of Accessory Muscles For Inspiration] : no accessory muscle use [Abnormal Walk] : normal gait [Involuntary Movements] : no involuntary movements were seen [] : no rash [No Focal Deficits] : no focal deficits [Affect] : the affect was normal

## 2021-03-23 NOTE — ASSESSMENT
[FreeTextEntry1] : Carlos is 55 year old man with chest pain . He has a history of reflux - but that does not bother him too much . This chest pain is a little more to the right . Associated with diet and movement. \par He is more worried about the pain. \par \par impression : GERD\par - not very symptomatic\par Hiatal hernia - likely small on CT \par - will get UGI And GI referal\par \par We explained in great detail the pathophysiology of the disease process. We carmela diagrams and discussed the workup for diagnosis and management.\par The various options were explained to the patient. The Risk , benefit and alternatives were discussed. We discussed recovery and possible complications.\par The Post operative care was explained to the patient. He was counselled on diet , exercise and wound care.\par We discussed the pathology and surgery with him.\par \par We discussed the importance of close follow up. \par We informed that he needs to follow up in 1-2 months .\par We also informed that he can call us if anything changes or has any questions.\par

## 2021-03-23 NOTE — PHYSICAL EXAM
[JVD] : no jugular venous distention  [Purpura] : no purpura  [Alert] : alert [Calm] : calm [de-identified] : Normal  [de-identified] : Normal

## 2021-03-23 NOTE — HISTORY OF PRESENT ILLNESS
[de-identified] : Carlos is 55 year old man with chest pain . He has a history of reflux - but that does not bother him too much . This chest pain is a little more to the right . Associated with diet and movement. \par He is more worried about the pain.

## 2021-04-18 ENCOUNTER — OUTPATIENT (OUTPATIENT)
Dept: OUTPATIENT SERVICES | Facility: HOSPITAL | Age: 56
LOS: 1 days | Discharge: HOME | End: 2021-04-18

## 2021-04-18 ENCOUNTER — LABORATORY RESULT (OUTPATIENT)
Age: 56
End: 2021-04-18

## 2021-04-18 DIAGNOSIS — Z11.59 ENCOUNTER FOR SCREENING FOR OTHER VIRAL DISEASES: ICD-10-CM

## 2021-04-20 ENCOUNTER — TRANSCRIPTION ENCOUNTER (OUTPATIENT)
Age: 56
End: 2021-04-20

## 2021-04-20 ENCOUNTER — RESULT REVIEW (OUTPATIENT)
Age: 56
End: 2021-04-20

## 2021-04-20 ENCOUNTER — OUTPATIENT (OUTPATIENT)
Dept: OUTPATIENT SERVICES | Facility: HOSPITAL | Age: 56
LOS: 1 days | Discharge: HOME | End: 2021-04-20
Payer: COMMERCIAL

## 2021-04-20 VITALS
WEIGHT: 169.98 LBS | HEART RATE: 64 BPM | RESPIRATION RATE: 20 BRPM | HEIGHT: 68 IN | SYSTOLIC BLOOD PRESSURE: 142 MMHG | DIASTOLIC BLOOD PRESSURE: 78 MMHG

## 2021-04-20 VITALS
OXYGEN SATURATION: 98 % | SYSTOLIC BLOOD PRESSURE: 135 MMHG | HEART RATE: 65 BPM | RESPIRATION RATE: 20 BRPM | DIASTOLIC BLOOD PRESSURE: 82 MMHG

## 2021-04-20 PROCEDURE — 88313 SPECIAL STAINS GROUP 2: CPT | Mod: 26

## 2021-04-20 PROCEDURE — 88312 SPECIAL STAINS GROUP 1: CPT | Mod: 26

## 2021-04-20 PROCEDURE — 43239 EGD BIOPSY SINGLE/MULTIPLE: CPT

## 2021-04-20 PROCEDURE — 88305 TISSUE EXAM BY PATHOLOGIST: CPT | Mod: 26

## 2021-04-20 RX ORDER — ROSUVASTATIN CALCIUM 5 MG/1
1 TABLET ORAL
Qty: 0 | Refills: 0 | DISCHARGE

## 2021-04-20 RX ORDER — AMLODIPINE BESYLATE 2.5 MG/1
1 TABLET ORAL
Qty: 0 | Refills: 0 | DISCHARGE

## 2021-04-20 NOTE — CHART NOTE - NSCHARTNOTEFT_GEN_A_CORE
PACU ANESTHESIA ADMISSION NOTE      Procedure:   Post op diagnosis:      ____  Intubated  TV:______       Rate: ______      FiO2: ______    __x__  Patent Airway    __x__  Full return of protective reflexes    __x__  Full recovery from anesthesia / back to baseline     Vitals:   See Anesthesia record  T- 98.1 P- 82 R- 18 B/P- 131/72 SPO2- 96% on RA    Mental Status:  __x__ Awake   _x____ Alert   _____ Drowsy   _____ Sedated    Nausea/Vomiting:  __x__ NO  ______Yes,   See Post - Op Orders          Pain Scale (0-10):  __0___    Treatment: ____ None    ____ See Post - Op/PCA Orders    Post - Operative Fluids:   ____ Oral   __x__ See Post - Op Orders    Plan: Discharge:   __x__Home       _____Floor     _____Critical Care    _____  Other:_________________    Comments: No anesthesia complications/issues noted. Discharge to HOME when criteria met.

## 2021-04-21 RX ORDER — PANTOPRAZOLE 40 MG/1
40 TABLET, DELAYED RELEASE ORAL DAILY
Qty: 30 | Refills: 1 | Status: ACTIVE | COMMUNITY
Start: 2021-04-21 | End: 1900-01-01

## 2021-04-24 LAB — SURGICAL PATHOLOGY STUDY: SIGNIFICANT CHANGE UP

## 2021-04-29 DIAGNOSIS — K22.10 ULCER OF ESOPHAGUS WITHOUT BLEEDING: ICD-10-CM

## 2021-04-29 DIAGNOSIS — K29.50 UNSPECIFIED CHRONIC GASTRITIS WITHOUT BLEEDING: ICD-10-CM

## 2021-04-29 DIAGNOSIS — R13.10 DYSPHAGIA, UNSPECIFIED: ICD-10-CM

## 2021-05-07 ENCOUNTER — APPOINTMENT (OUTPATIENT)
Dept: SURGERY | Facility: CLINIC | Age: 56
End: 2021-05-07
Payer: COMMERCIAL

## 2021-05-07 VITALS
HEART RATE: 81 BPM | WEIGHT: 161 LBS | DIASTOLIC BLOOD PRESSURE: 75 MMHG | BODY MASS INDEX: 24.4 KG/M2 | SYSTOLIC BLOOD PRESSURE: 120 MMHG | TEMPERATURE: 97 F | HEIGHT: 68 IN

## 2021-05-07 DIAGNOSIS — K44.9 DIAPHRAGMATIC HERNIA W/OUT OBSTRUCTION OR GANGRENE: ICD-10-CM

## 2021-05-07 DIAGNOSIS — K21.9 GASTRO-ESOPHAGEAL REFLUX DISEASE W/OUT ESOPHAGITIS: ICD-10-CM

## 2021-05-07 PROCEDURE — 99213 OFFICE O/P EST LOW 20 MIN: CPT

## 2021-05-07 PROCEDURE — 99072 ADDL SUPL MATRL&STAF TM PHE: CPT

## 2021-05-07 NOTE — ASSESSMENT
[FreeTextEntry1] : Carlos is 55 year old man with chest pain . He has a history of reflux - but that does not bother him too much . This chest pain is a little more to the right . Associated with diet and movement. \par He is more worried about the pain. \par 5/7: He underwent an EGD where there was almost no Hiatal hernia and minimal esophagitis - no dysplasia. He is even not taking any PPI.\par He has taken some life style changes that has helped. \par \par impression : GERD\par - not very symptomatic\par Hiatal hernia - likely small on CT and EGD\par - Upper GI pending\par No need of any other treatment. \par \par We explained in great detail the pathophysiology of the disease process. We carmela diagrams and discussed the workup for diagnosis and management.\par The various options were explained to the patient. The Risk , benefit and alternatives were discussed. We discussed recovery and possible complications.\par The Post operative care was explained to the patient. He was counselled on diet , exercise and wound care.\par We discussed the pathology and surgery with him.\par \par We discussed the importance of close follow up. \par We informed that he needs to follow up in 6 months .\par We also informed that he can call us if anything changes or has any questions.\par

## 2021-05-07 NOTE — HISTORY OF PRESENT ILLNESS
[de-identified] : Carlos is 55 year old man with chest pain . He has a history of reflux - but that does not bother him too much . This chest pain is a little more to the right . Associated with diet and movement. \par He is more worried about the pain. \par 5/7: He underwent an EGD where there was almost no Hiatal hernia and minimal esophagitis - no dysplasia. He is even not takin any PPI.\par He has taken some life style changes that has helped. \par

## 2021-05-07 NOTE — PHYSICAL EXAM
[JVD] : no jugular venous distention  [Purpura] : no purpura  [Alert] : alert [Calm] : calm [de-identified] : Normal  [de-identified] : Normal

## 2021-06-07 ENCOUNTER — APPOINTMENT (OUTPATIENT)
Dept: GASTROENTEROLOGY | Facility: CLINIC | Age: 56
End: 2021-06-07

## 2021-10-26 ENCOUNTER — EMERGENCY (EMERGENCY)
Facility: HOSPITAL | Age: 56
LOS: 0 days | Discharge: HOME | End: 2021-10-26
Attending: EMERGENCY MEDICINE | Admitting: STUDENT IN AN ORGANIZED HEALTH CARE EDUCATION/TRAINING PROGRAM
Payer: COMMERCIAL

## 2021-10-26 VITALS
TEMPERATURE: 98 F | HEART RATE: 67 BPM | HEIGHT: 68 IN | OXYGEN SATURATION: 100 % | DIASTOLIC BLOOD PRESSURE: 92 MMHG | SYSTOLIC BLOOD PRESSURE: 163 MMHG | RESPIRATION RATE: 18 BRPM

## 2021-10-26 DIAGNOSIS — S80.861A INSECT BITE (NONVENOMOUS), RIGHT LOWER LEG, INITIAL ENCOUNTER: ICD-10-CM

## 2021-10-26 DIAGNOSIS — E78.5 HYPERLIPIDEMIA, UNSPECIFIED: ICD-10-CM

## 2021-10-26 DIAGNOSIS — I10 ESSENTIAL (PRIMARY) HYPERTENSION: ICD-10-CM
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
## 2021-10-26 PROCEDURE — 99283 EMERGENCY DEPT VISIT LOW MDM: CPT

## 2021-10-26 RX ADMIN — Medication 200 MILLIGRAM(S): at 10:44

## 2021-10-26 NOTE — ED PROVIDER NOTE - ATTENDING CONTRIBUTION TO CARE
55 y/o male with above stated PMHx presents to ED with tick bit to right leg, likely embedded > 72 hrs.  No fever or chills.  No HA.  No CP/SOB.  No other complaints.  PE:  agree with above.  A/P:  Tick Bite Doxy and D/C.  F/U with ID.

## 2021-10-26 NOTE — ED PROVIDER NOTE - NSFOLLOWUPINSTRUCTIONS_ED_ALL_ED_FT
Please follow up with your primary care doctor in 1-3 days  Please be aware of any new or worsening signs or symptoms that should prompt your return to the ER.      Lyme Disease    WHAT YOU NEED TO KNOW:    Lyme disease is a bacterial infection caused by the bite of an infected tick.    DISCHARGE INSTRUCTIONS:    Call your local emergency number (911 in the [place]) if:     Your heart is beating faster than usual and you feel dizzy.       You have chest pain or trouble breathing.      You suddenly cannot talk or see well, or you have trouble moving an area of your body.    Return to the emergency department if:     You have a headache and a stiff neck.      You have trouble concentrating or thinking clearly.      You have numbness or tingling in your arms or legs, or you have trouble walking.    Call your doctor if:     Your rash grows or spreads to other areas of your body.      You suddenly have trouble falling or staying asleep.      You have new or worsening pain and swelling in your joints.      You have new or worsening weakness and muscle pain.      You have a new tick bite.      You have questions or concerns about your condition or care.    Medicines:     Antibiotics treat a bacterial infection.       NSAIDs, such as ibuprofen, help decrease swelling, pain, and fever. This medicine is available with or without a doctor's order. NSAIDs can cause stomach bleeding or kidney problems in certain people. If you take blood thinner medicine, always ask your healthcare provider if NSAIDs are safe for you. Always read the medicine label and follow directions.      Take your medicine as directed. Contact your healthcare provider if you think your medicine is not helping or if you have side effects. Tell him of her if you are allergic to any medicine. Keep a list of the medicines, vitamins, and herbs you take. Include the amounts, and when and why you take them. Bring the list or the pill bottles to follow-up visits. Carry your medicine list with you in case of an emergency.    Prevent a tick bite: Ticks live in areas covered by brush and grass. They may even be found in your lawn if you live in certain areas. Outdoor pets can carry ticks inside the house. Ticks can grab onto you or your clothes when you walk by grass or brush. If you go into areas that contain many trees, tall grasses, and underbrush, do the following:    Wear light colored pants and a long-sleeved shirt. Tuck your pants into your socks or boots. Tuck in your shirt. Wear sleeves that fit close to the skin at your wrists and neck. This will help prevent ticks from crawling through gaps in your clothing and onto your skin. Wear a hat in areas with trees.      Apply insect repellant on your skin. The insect repellant should contain DEET. Do not put insect repellant on skin that is cut, scratched, or irritated. Always use soap and water to wash the insect repellant off as soon as possible once you are indoors. Do not apply insect repellant on your child's face or hands.      Spray insect repellant onto your clothes. Use permethrin spray. This spray kills ticks that crawl on your clothing. Be sure to spray the tops of your boots, bottom of pant legs, and sleeve cuffs. As soon as possible, wash and dry clothing in hot water and high heat.      Check your and your child's clothing, hair, and skin for ticks. Shower within 2 hours of coming indoors. Carefully check the hairline, armpits, neck, and waist.       Decrease the risk for ticks in your yard. Ticks like to live in shady, moist areas. Mow your lawn regularly to keep the grass short. Trim the grass around birdbaths and fences. Cut branches that are overgrown and take them out of the yard. Clear out leaf piles. Stack firewood in a dry, param area.      Treat pets with tick control products as directed. This will decrease your risk for a tick bite. Check your pets for ticks. Remove ticks from pets the same way as you remove them from people. Ask your pet's  about the best product to use on your pet.       Remove a tick with tweezers. Wear gloves. Grasp the tick as close to your skin as possible. Pull the tick straight up and out. Do not touch the tick with your bare hands. Check to make sure you removed the whole tick, including the head. Clean the area with soap and water or rubbing alcohol. Then wash your hands with soap and water.    Follow up with your doctor as directed: Write down your questions so you remember to ask them during your visits.        © Copyright [employer] 2020

## 2021-10-26 NOTE — ED PROVIDER NOTE - PATIENT PORTAL LINK FT
You can access the FollowMyHealth Patient Portal offered by [facility] by registering at the following website: [email or web address]. By joining [employer]’s FollowMyHealth portal, you will also be able to view your health information using other applications (apps) compatible with our system.

## 2021-10-26 NOTE — ED PROVIDER NOTE - OBJECTIVE STATEMENT
56 year old male, past medical history htn, hdl, who presents s/p tic bite. patient reports noticing "scab" to medial aspect of right leg x3 days ago, patient removed scab today when he noticed it was a tic, no active bleeding. reports hx similar. no fever, chills, headache, lightheadedness, chest pain, SOB, numbness/weakness/paresthesias.

## 2021-10-26 NOTE — ED PROVIDER NOTE - NS ED ROS FT
Review of Systems:  	•	CONSTITUTIONAL: no fever, no chills  	•	SKIN: +tick bite   	•	ENT: no sore throat   	•	RESPIRATORY: no shortness of breath, no cough  	•	CARDIAC: no chest pain, no palpitations  	•	GI: no abd pain, no nausea, no vomiting, no diarrhea  	•	GENITO-URINARY: no discharge, no dysuria; no hematuria, no increased urinary frequency  	•	MUSCULOSKELETAL: no joint paint, no swelling, no redness  	•	NEUROLOGIC: no weakness, no headache   	•	PSYCH: no anxiety, non suicidal, non homicidal, no hallucination, no depression

## 2021-10-26 NOTE — ED PROVIDER NOTE - PHYSICAL EXAMINATION
CONSTITUTIONAL: Well-developed; well-nourished; in no acute distress, nontoxic appearing  SKIN: 2mm circumferential area of erythema to medial aspect of right leg, no weeping/drainage/bleeding. no target lesions.   HEAD: Normocephalic; atraumatic  EYES: conjunctiva and sclera clear  ENT: MMM   NECK:  ROM intact.  CARD: S1, S2 normal, no murmur  RESP: No wheezes, rales or rhonchi. Good air movement bilaterally  ABD: soft; non-distended; non-tender  EXT: Normal ROM  NEURO: awake, alert, following commands, oriented, grossly unremarkable. No Focal deficits. GCS 15.   PSYCH: Cooperative, appropriate.

## 2021-12-14 ENCOUNTER — APPOINTMENT (OUTPATIENT)
Dept: UROLOGY | Facility: CLINIC | Age: 56
End: 2021-12-14

## 2021-12-16 ENCOUNTER — TRANSCRIPTION ENCOUNTER (OUTPATIENT)
Age: 56
End: 2021-12-16

## 2022-01-13 ENCOUNTER — APPOINTMENT (OUTPATIENT)
Dept: UROLOGY | Facility: CLINIC | Age: 57
End: 2022-01-13

## 2022-01-25 ENCOUNTER — APPOINTMENT (OUTPATIENT)
Dept: UROLOGY | Facility: CLINIC | Age: 57
End: 2022-01-25
Payer: COMMERCIAL

## 2022-01-25 VITALS — TEMPERATURE: 97.6 F | BODY MASS INDEX: 24.4 KG/M2 | HEIGHT: 68 IN | WEIGHT: 161 LBS

## 2022-01-25 PROCEDURE — 99214 OFFICE O/P EST MOD 30 MIN: CPT

## 2022-02-18 ENCOUNTER — OUTPATIENT (OUTPATIENT)
Dept: OUTPATIENT SERVICES | Facility: HOSPITAL | Age: 57
LOS: 1 days | Discharge: HOME | End: 2022-02-18
Payer: COMMERCIAL

## 2022-02-18 ENCOUNTER — RESULT REVIEW (OUTPATIENT)
Age: 57
End: 2022-02-18

## 2022-02-18 DIAGNOSIS — N20.0 CALCULUS OF KIDNEY: ICD-10-CM

## 2022-02-18 PROCEDURE — 74176 CT ABD & PELVIS W/O CONTRAST: CPT | Mod: 26

## 2022-02-18 PROCEDURE — 74019 RADEX ABDOMEN 2 VIEWS: CPT | Mod: 26

## 2022-03-03 ENCOUNTER — APPOINTMENT (OUTPATIENT)
Dept: UROLOGY | Facility: CLINIC | Age: 57
End: 2022-03-03
Payer: COMMERCIAL

## 2022-03-03 ENCOUNTER — RESULT REVIEW (OUTPATIENT)
Age: 57
End: 2022-03-03

## 2022-03-03 VITALS — WEIGHT: 161 LBS | TEMPERATURE: 97.5 F | BODY MASS INDEX: 24.4 KG/M2 | HEIGHT: 68 IN

## 2022-03-03 LAB
BILIRUB UR QL STRIP: NORMAL
COLLECTION METHOD: NORMAL
GLUCOSE UR-MCNC: NORMAL
HCG UR QL: 0.2 EU/DL
HGB UR QL STRIP.AUTO: NORMAL
KETONES UR-MCNC: NORMAL
LEUKOCYTE ESTERASE UR QL STRIP: NORMAL
NITRITE UR QL STRIP: NORMAL
PH UR STRIP: 6.5
PROT UR STRIP-MCNC: NORMAL
SP GR UR STRIP: 1.02

## 2022-03-03 PROCEDURE — 99213 OFFICE O/P EST LOW 20 MIN: CPT

## 2022-03-03 PROCEDURE — 81003 URINALYSIS AUTO W/O SCOPE: CPT | Mod: QW

## 2022-03-03 NOTE — HISTORY OF PRESENT ILLNESS
[Urinary Urgency] : urinary urgency [Urinary Frequency] : urinary frequency [Nocturia] : nocturia [None] : None [FreeTextEntry1] : 56 year old male presents to the office for a follow up of BPH/LUTS. Patient has a history of a left epididymal cyst. Patient had blood and protein on a UA done by his PCP (Dr. Shivam Roblero). . He reports feeling well. He denies any fever, nausea, and other constitutional symptoms. Patient is accompanied by his wife via phone call. \par \par All past and present data reviewed:\par 08/2020 PSA= .5  // BUN= 15  CREAT= 1.0\par 01/2022 PSA= ND== 0.5\par \par All following data from PCP reviewed:\par 12/21/2021 UA= 2+ blood\par 12/28/2021 UA= 0 RBC\par 12/28/2021 Cytology= negative\par 01/2022 US= right MP 5 mm stone  // right LP 4 mm stone  //  bladder US not interpretable (bladder not distended)\par 2/2022 kub= reports multiple small right renal calculi [film reviewed by me= difficulty visualizing calculi. ]\par 2/2022 CT scan = 3 right renal calculi: 3 mm upper pole.  3 mm midpole.  3 to 4 mm lower pole . [Straining] : no straining [Weak Stream] : no weak stream [Dysuria] : no dysuria [Hematuria - Gross] : no gross hematuria [Fatigue] : no fatigue [Anorexia] : no anorexia

## 2022-03-03 NOTE — ASSESSMENT
[FreeTextEntry1] : #1. BPH, clinically\par #2. Left epididymal cyst = based on ultrasound imaging and physical exam\par #3. right nephrolithiasis -small.  May not be feasible for ESWL\par \par Plan:\par -Reviewed and discussed blood work from patient's PCP.\par -Discussed right renal calculi appear to small for ESWL due to difficulty visualizing the calculi during the procedure.  Patient seems to understand.\par -Discussed chance for spontaneous passage.\par -KUB. 12 months \par -PSA. 12 months \par -RTO 12 months

## 2022-09-01 ENCOUNTER — APPOINTMENT (OUTPATIENT)
Dept: ORTHOPEDIC SURGERY | Facility: CLINIC | Age: 57
End: 2022-09-01

## 2022-09-27 ENCOUNTER — APPOINTMENT (OUTPATIENT)
Dept: UROLOGY | Facility: CLINIC | Age: 57
End: 2022-09-27

## 2022-09-27 VITALS
DIASTOLIC BLOOD PRESSURE: 81 MMHG | BODY MASS INDEX: 24.4 KG/M2 | TEMPERATURE: 98 F | SYSTOLIC BLOOD PRESSURE: 136 MMHG | HEIGHT: 68 IN | WEIGHT: 161 LBS | HEART RATE: 85 BPM | RESPIRATION RATE: 14 BRPM

## 2022-09-27 DIAGNOSIS — R36.1 HEMATOSPERMIA: ICD-10-CM

## 2022-09-27 PROCEDURE — 99213 OFFICE O/P EST LOW 20 MIN: CPT

## 2022-10-13 ENCOUNTER — APPOINTMENT (OUTPATIENT)
Dept: UROLOGY | Facility: CLINIC | Age: 57
End: 2022-10-13

## 2022-11-01 ENCOUNTER — NON-APPOINTMENT (OUTPATIENT)
Age: 57
End: 2022-11-01

## 2023-02-25 ENCOUNTER — NON-APPOINTMENT (OUTPATIENT)
Age: 58
End: 2023-02-25

## 2023-03-03 ENCOUNTER — OUTPATIENT (OUTPATIENT)
Dept: OUTPATIENT SERVICES | Facility: HOSPITAL | Age: 58
LOS: 1 days | End: 2023-03-03
Payer: COMMERCIAL

## 2023-03-03 DIAGNOSIS — N20.0 CALCULUS OF KIDNEY: ICD-10-CM

## 2023-03-03 DIAGNOSIS — R10.9 UNSPECIFIED ABDOMINAL PAIN: ICD-10-CM

## 2023-03-03 PROCEDURE — 74021 RADEX ABDOMEN 3+ VIEWS: CPT | Mod: 26

## 2023-03-03 PROCEDURE — 74021 RADEX ABDOMEN 3+ VIEWS: CPT

## 2023-03-04 DIAGNOSIS — R10.9 UNSPECIFIED ABDOMINAL PAIN: ICD-10-CM

## 2023-03-04 DIAGNOSIS — N20.0 CALCULUS OF KIDNEY: ICD-10-CM

## 2023-03-15 LAB — PSA SERPL-MCNC: 0.69 NG/ML

## 2023-03-28 ENCOUNTER — APPOINTMENT (OUTPATIENT)
Dept: UROLOGY | Facility: CLINIC | Age: 58
End: 2023-03-28
Payer: COMMERCIAL

## 2023-03-28 VITALS
HEIGHT: 68 IN | DIASTOLIC BLOOD PRESSURE: 80 MMHG | HEART RATE: 80 BPM | SYSTOLIC BLOOD PRESSURE: 140 MMHG | WEIGHT: 162 LBS | BODY MASS INDEX: 24.55 KG/M2

## 2023-03-28 DIAGNOSIS — Z00.00 ENCOUNTER FOR GENERAL ADULT MEDICAL EXAMINATION W/OUT ABNORMAL FINDINGS: ICD-10-CM

## 2023-03-28 LAB
BILIRUB UR QL STRIP: NORMAL
COLLECTION METHOD: NORMAL
GLUCOSE UR-MCNC: NORMAL
HCG UR QL: 0.2 EU/DL
HGB UR QL STRIP.AUTO: NORMAL
KETONES UR-MCNC: NORMAL
LEUKOCYTE ESTERASE UR QL STRIP: NORMAL
NITRITE UR QL STRIP: NORMAL
PH UR STRIP: 6
PROT UR STRIP-MCNC: NORMAL
SP GR UR STRIP: 1.02

## 2023-03-28 PROCEDURE — 99213 OFFICE O/P EST LOW 20 MIN: CPT

## 2023-04-12 ENCOUNTER — FORM ENCOUNTER (OUTPATIENT)
Age: 58
End: 2023-04-12

## 2023-05-21 ENCOUNTER — NON-APPOINTMENT (OUTPATIENT)
Age: 58
End: 2023-05-21

## 2023-05-30 ENCOUNTER — APPOINTMENT (OUTPATIENT)
Dept: ORTHOPEDIC SURGERY | Facility: CLINIC | Age: 58
End: 2023-05-30
Payer: OTHER MISCELLANEOUS

## 2023-05-30 PROCEDURE — 99213 OFFICE O/P EST LOW 20 MIN: CPT

## 2023-05-30 NOTE — HISTORY OF PRESENT ILLNESS
[de-identified] : Patient is here for evaluation of right shoulder pain\par Affecting quality of life\par Wakes up at night due to pain\par \par NAD\par Right shoulder:\par TTP ant GH joint, bicipital groove\par FF 0-175\par ER 60\par IR T12\par 4/5 strength scapular abduction, ER, IR\par Pos Impingement\par Pos Roca\par Pos Cross Arm Adduction\par Negative instability\par \par XRay right shoulder negative for fracture, dislocation, arthritis\par \par mri right shoulder high grade pRCT\par \par Plan\par went over findings\par explained the mri\par op vs nonop and tx options discussed\par will think about injections and surgery\par cont pt and hep\par pain control\par fu in 6 weeks\par

## 2023-06-19 ENCOUNTER — NON-APPOINTMENT (OUTPATIENT)
Age: 58
End: 2023-06-19

## 2023-07-11 ENCOUNTER — APPOINTMENT (OUTPATIENT)
Dept: ORTHOPEDIC SURGERY | Facility: CLINIC | Age: 58
End: 2023-07-11
Payer: OTHER MISCELLANEOUS

## 2023-07-11 PROCEDURE — 99213 OFFICE O/P EST LOW 20 MIN: CPT

## 2023-07-11 NOTE — HISTORY OF PRESENT ILLNESS
[de-identified] : Patient is here for evaluation of right shoulder pain\par Affecting quality of life\par Wakes up at night due to pain\par \par Pain is 6/10\par \par NAD\par Right shoulder:\par TTP ant GH joint, bicipital groove\par FF 0-175\par ER 60\par IR T12\par 4/5 strength scapular abduction, ER, IR\par Pos Impingement\par Pos Roca\par Pos Cross Arm Adduction\par Negative instability\par \par XRay right shoulder negative for fracture, dislocation, arthritis\par \par mri right shoulder high grade pRCT\par \par Plan\par went over findings\par explained the mri\par op vs nonop and tx options discussed\par will think about injections and surgery\par cont pt and hep\par pain control\par fu in 2-3 mnths\par

## 2023-08-19 ENCOUNTER — EMERGENCY (EMERGENCY)
Facility: HOSPITAL | Age: 58
LOS: 0 days | Discharge: HOME | End: 2023-08-19
Attending: EMERGENCY MEDICINE
Payer: COMMERCIAL

## 2023-08-19 VITALS
WEIGHT: 154.98 LBS | OXYGEN SATURATION: 100 % | HEART RATE: 80 BPM | TEMPERATURE: 98 F | SYSTOLIC BLOOD PRESSURE: 133 MMHG | RESPIRATION RATE: 18 BRPM | DIASTOLIC BLOOD PRESSURE: 70 MMHG

## 2023-08-19 DIAGNOSIS — I10 ESSENTIAL (PRIMARY) HYPERTENSION: ICD-10-CM

## 2023-08-19 DIAGNOSIS — E78.5 HYPERLIPIDEMIA, UNSPECIFIED: ICD-10-CM

## 2023-08-19 DIAGNOSIS — M79.674 PAIN IN RIGHT TOE(S): ICD-10-CM
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
## 2023-08-19 DIAGNOSIS — S90.821A BLISTER (NONTHERMAL), RIGHT FOOT, INITIAL ENCOUNTER: ICD-10-CM

## 2023-08-19 PROCEDURE — 73630 X-RAY EXAM OF FOOT: CPT | Mod: 26,RT

## 2023-08-19 PROCEDURE — 99284 EMERGENCY DEPT VISIT MOD MDM: CPT

## 2023-08-19 PROCEDURE — 99283 EMERGENCY DEPT VISIT LOW MDM: CPT | Mod: 25

## 2023-08-19 PROCEDURE — 73630 X-RAY EXAM OF FOOT: CPT | Mod: RT

## 2023-08-19 NOTE — ED PROVIDER NOTE - PHYSICAL EXAMINATION
Vital Signs: I have reviewed the initial vital signs.  Constitutional: appears stated age, no acute distress  Cardiovascular: regular rate, regular rhythm, well-perfused extremities  Respiratory: unlabored respiratory effort,   MSK: 0.5cm x .5cm Blister noted in webspace between first and second toe. Tender to palpation, tender on ambulation.

## 2023-08-19 NOTE — ED PROVIDER NOTE - CLINICAL SUMMARY MEDICAL DECISION MAKING FREE TEXT BOX
Patient presented with 2nd R toe pain as documented x several days. Otherwise afebrile, HD stable, neurovascularly intact, no evidence of infection on exam. Full passive and active ROM all joints. (+) blister noted on exam with ?possible FB. Xray obtained and negative for FB or bony abnormality. Consulted podiatry who recommended outpatient follow up, no further emergent intervention. Patient agreeable with plan. Agrees to return to ED for any new or worsening symptoms. Patient presented with 2nd R toe pain as documented x several days. Otherwise afebrile, HD stable, neurovascularly intact, no evidence of infection on exam. Full passive and active ROM all joints. (+) blister noted on exam with ?possible FB. Xray obtained and negative for FB or bony abnormality. Consulted podiatry, but prior to podiatry evaluation patient states that he prefers to f/u outpatient. Agrees to return to ED for any new or worsening symptoms.

## 2023-08-19 NOTE — ED PROVIDER NOTE - PATIENT PORTAL LINK FT
You can access the FollowMyHealth Patient Portal offered by [facility] by registering at the following website: [email or web address]. By joining [employer]’s FollowMyHealth portal, you will also be able to view your health information using other applications (apps) compatible with our system.

## 2023-08-19 NOTE — ED ADULT NURSE NOTE - NSFALLUNIVINTERV_ED_ALL_ED
Bed/Stretcher in lowest position, wheels locked, appropriate side rails in place/Call bell, personal items and telephone in reach/Instruct patient to call for assistance before getting out of bed/chair/stretcher/Non-slip footwear applied when patient is off stretcher/[place] to call system/Physically safe environment - no spills, clutter or unnecessary equipment/Purposeful proactive rounding/Room/bathroom lighting operational, light cord in reach

## 2023-08-19 NOTE — ED PROVIDER NOTE - OBJECTIVE STATEMENT
58y M hx HTN presenting for eval of R 2nd toe pain with ambulation progressing over the past few days. 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

## 2023-08-22 ENCOUNTER — NON-APPOINTMENT (OUTPATIENT)
Age: 58
End: 2023-08-22

## 2023-09-01 ENCOUNTER — OUTPATIENT (OUTPATIENT)
Dept: OUTPATIENT SERVICES | Facility: HOSPITAL | Age: 58
LOS: 1 days | End: 2023-09-01
Payer: COMMERCIAL

## 2023-09-01 DIAGNOSIS — R10.9 UNSPECIFIED ABDOMINAL PAIN: ICD-10-CM

## 2023-09-01 DIAGNOSIS — Z00.8 ENCOUNTER FOR OTHER GENERAL EXAMINATION: ICD-10-CM

## 2023-09-01 PROCEDURE — 76705 ECHO EXAM OF ABDOMEN: CPT

## 2023-09-01 PROCEDURE — 76705 ECHO EXAM OF ABDOMEN: CPT | Mod: 26

## 2023-09-02 DIAGNOSIS — R10.9 UNSPECIFIED ABDOMINAL PAIN: ICD-10-CM

## 2023-09-26 ENCOUNTER — APPOINTMENT (OUTPATIENT)
Dept: ORTHOPEDIC SURGERY | Facility: CLINIC | Age: 58
End: 2023-09-26
Payer: OTHER MISCELLANEOUS

## 2023-09-26 VITALS — HEIGHT: 68 IN | BODY MASS INDEX: 23.49 KG/M2 | WEIGHT: 155 LBS

## 2023-09-26 PROCEDURE — 20611 DRAIN/INJ JOINT/BURSA W/US: CPT | Mod: RT

## 2023-09-26 PROCEDURE — 99214 OFFICE O/P EST MOD 30 MIN: CPT | Mod: 25

## 2023-10-03 ENCOUNTER — APPOINTMENT (OUTPATIENT)
Dept: ORTHOPEDIC SURGERY | Facility: CLINIC | Age: 58
End: 2023-10-03
Payer: COMMERCIAL

## 2023-10-03 ENCOUNTER — NON-APPOINTMENT (OUTPATIENT)
Age: 58
End: 2023-10-03

## 2023-10-03 ENCOUNTER — APPOINTMENT (OUTPATIENT)
Dept: ORTHOPEDIC SURGERY | Facility: CLINIC | Age: 58
End: 2023-10-03
Payer: OTHER MISCELLANEOUS

## 2023-10-03 VITALS — HEIGHT: 68 IN | WEIGHT: 160 LBS | BODY MASS INDEX: 24.25 KG/M2

## 2023-10-03 DIAGNOSIS — M79.669 PAIN IN UNSPECIFIED LOWER LEG: ICD-10-CM

## 2023-10-03 PROCEDURE — 73590 X-RAY EXAM OF LOWER LEG: CPT | Mod: RT

## 2023-10-03 PROCEDURE — 99213 OFFICE O/P EST LOW 20 MIN: CPT

## 2023-10-03 PROCEDURE — 99214 OFFICE O/P EST MOD 30 MIN: CPT

## 2023-10-30 ENCOUNTER — RX RENEWAL (OUTPATIENT)
Age: 58
End: 2023-10-30

## 2023-11-28 ENCOUNTER — APPOINTMENT (OUTPATIENT)
Dept: ORTHOPEDIC SURGERY | Facility: CLINIC | Age: 58
End: 2023-11-28
Payer: OTHER MISCELLANEOUS

## 2023-11-28 VITALS — HEIGHT: 68 IN | BODY MASS INDEX: 24.25 KG/M2 | WEIGHT: 160 LBS

## 2023-11-28 PROCEDURE — 99213 OFFICE O/P EST LOW 20 MIN: CPT

## 2023-12-01 ENCOUNTER — RX RENEWAL (OUTPATIENT)
Age: 58
End: 2023-12-01

## 2023-12-01 RX ORDER — MELOXICAM 15 MG/1
15 TABLET ORAL
Qty: 30 | Refills: 0 | Status: ACTIVE | COMMUNITY
Start: 2023-10-02 | End: 1900-01-01

## 2024-02-27 ENCOUNTER — APPOINTMENT (OUTPATIENT)
Dept: ORTHOPEDIC SURGERY | Facility: CLINIC | Age: 59
End: 2024-02-27
Payer: OTHER MISCELLANEOUS

## 2024-02-27 ENCOUNTER — NON-APPOINTMENT (OUTPATIENT)
Age: 59
End: 2024-02-27

## 2024-02-27 PROCEDURE — 99245 OFF/OP CONSLTJ NEW/EST HI 55: CPT

## 2024-02-27 NOTE — WORK
[Right] : right [FreeTextEntry7] : shoulder [FreeTextEntry8] : FF 0-160 ER 40 IR L5 Abd 90 4/5 strength scapular abduction, ER, IR [de-identified] : rotator cuff tear [FreeTextEntry5] : 40

## 2024-02-27 NOTE — HISTORY OF PRESENT ILLNESS
[de-identified] : Patient is here for evaluation of right shoulder pain Affecting quality of life Wakes up at night due to pain  Still in pain, injection did not help  Pain is getting worse  NAD Right shoulder: TTP ant GH joint, bicipital groove TTP ac joint FF 0-160 ER 40 IR L5 Abd 90 4/5 strength scapular abduction, ER, IR Pos Impingement Pos Roca Pos Cross Arm Adduction Negative instability  XRay right shoulder negative for fracture, dislocation, arthritis  mri right shoulder high grade pRCT, ac arthritis, delamination of subscap  Plan went over findings this is wc case and slu

## 2024-03-07 ENCOUNTER — NON-APPOINTMENT (OUTPATIENT)
Age: 59
End: 2024-03-07

## 2024-03-15 ENCOUNTER — NON-APPOINTMENT (OUTPATIENT)
Age: 59
End: 2024-03-15

## 2024-03-19 ENCOUNTER — APPOINTMENT (OUTPATIENT)
Dept: UROLOGY | Facility: CLINIC | Age: 59
End: 2024-03-19
Payer: COMMERCIAL

## 2024-03-19 DIAGNOSIS — N50.3 CYST OF EPIDIDYMIS: ICD-10-CM

## 2024-03-19 DIAGNOSIS — N13.8 BENIGN PROSTATIC HYPERPLASIA WITH LOWER URINARY TRACT SYMPMS: ICD-10-CM

## 2024-03-19 DIAGNOSIS — N20.0 CALCULUS OF KIDNEY: ICD-10-CM

## 2024-03-19 DIAGNOSIS — N40.1 BENIGN PROSTATIC HYPERPLASIA WITH LOWER URINARY TRACT SYMPMS: ICD-10-CM

## 2024-03-19 PROCEDURE — 99214 OFFICE O/P EST MOD 30 MIN: CPT

## 2024-03-19 PROCEDURE — G2211 COMPLEX E/M VISIT ADD ON: CPT

## 2024-04-09 ENCOUNTER — APPOINTMENT (OUTPATIENT)
Dept: ORTHOPEDIC SURGERY | Facility: CLINIC | Age: 59
End: 2024-04-09
Payer: OTHER MISCELLANEOUS

## 2024-04-09 DIAGNOSIS — M25.511 PAIN IN RIGHT SHOULDER: ICD-10-CM

## 2024-04-09 PROCEDURE — 99213 OFFICE O/P EST LOW 20 MIN: CPT

## 2024-04-09 NOTE — HISTORY OF PRESENT ILLNESS
[de-identified] : Patient is here for evaluation of right shoulder pain Affecting quality of life Wakes up at night due to pain  Still in pain, injection did not help  Pain is getting worse  NAD Right shoulder: TTP ant GH joint, bicipital groove TTP ac joint FF 0-160 ER 40 IR L5 Abd 90 4/5 strength scapular abduction, ER, IR Pos Impingement Pos Roca Pos Cross Arm Adduction Negative instability  XRay right shoulder negative for fracture, dislocation, arthritis  mri right shoulder high grade pRCT, ac arthritis, delamination of subscap  Plan grey findings explained the mri and tx will cont cons tx will send new pt script fu in 2-3 months

## 2025-01-03 NOTE — ED ADULT TRIAGE NOTE - CHIEF COMPLAINT QUOTE
Home blood pressure checks once a day for two weeks and let me know My chart   Voltarel gel for knee pain   
left testicular pain for three weeks
I attest my time as attending is greater than 50% of the total combined time spent on qualifying patient care activities by the PA/NP and attending.
I attest my time as attending is greater than 50% of the total combined time spent on qualifying patient care activities by the PA/NP and attending.

## 2025-01-21 ENCOUNTER — APPOINTMENT (OUTPATIENT)
Dept: ORTHOPEDIC SURGERY | Facility: CLINIC | Age: 60
End: 2025-01-21
Payer: OTHER MISCELLANEOUS

## 2025-01-21 PROCEDURE — 99075 MEDICAL TESTIMONY: CPT

## 2025-02-10 ENCOUNTER — EMERGENCY (EMERGENCY)
Facility: HOSPITAL | Age: 60
LOS: 0 days | Discharge: HOME | End: 2025-02-10
Attending: EMERGENCY MEDICINE
Payer: COMMERCIAL

## 2025-02-10 VITALS
OXYGEN SATURATION: 99 % | WEIGHT: 154.98 LBS | RESPIRATION RATE: 18 BRPM | DIASTOLIC BLOOD PRESSURE: 84 MMHG | HEIGHT: 68 IN | HEART RATE: 60 BPM | SYSTOLIC BLOOD PRESSURE: 135 MMHG

## 2025-02-10 VITALS
HEART RATE: 66 BPM | SYSTOLIC BLOOD PRESSURE: 139 MMHG | TEMPERATURE: 98 F | OXYGEN SATURATION: 100 % | DIASTOLIC BLOOD PRESSURE: 83 MMHG | RESPIRATION RATE: 18 BRPM

## 2025-02-10 LAB
ALBUMIN SERPL ELPH-MCNC: 4.5 G/DL — SIGNIFICANT CHANGE UP (ref 3.5–5.2)
ALP SERPL-CCNC: 44 U/L — SIGNIFICANT CHANGE UP (ref 30–115)
ALT FLD-CCNC: 20 U/L — SIGNIFICANT CHANGE UP (ref 0–41)
ANION GAP SERPL CALC-SCNC: 11 MMOL/L — SIGNIFICANT CHANGE UP (ref 7–14)
AST SERPL-CCNC: 27 U/L — SIGNIFICANT CHANGE UP (ref 0–41)
BASOPHILS # BLD AUTO: 0.04 K/UL — SIGNIFICANT CHANGE UP (ref 0–0.2)
BASOPHILS NFR BLD AUTO: 0.6 % — SIGNIFICANT CHANGE UP (ref 0–1)
BILIRUB SERPL-MCNC: 0.7 MG/DL — SIGNIFICANT CHANGE UP (ref 0.2–1.2)
BUN SERPL-MCNC: 16 MG/DL — SIGNIFICANT CHANGE UP (ref 10–20)
CALCIUM SERPL-MCNC: 9.2 MG/DL — SIGNIFICANT CHANGE UP (ref 8.4–10.5)
CHLORIDE SERPL-SCNC: 101 MMOL/L — SIGNIFICANT CHANGE UP (ref 98–110)
CO2 SERPL-SCNC: 25 MMOL/L — SIGNIFICANT CHANGE UP (ref 17–32)
CREAT SERPL-MCNC: 1.1 MG/DL — SIGNIFICANT CHANGE UP (ref 0.7–1.5)
EGFR: 78 ML/MIN/1.73M2 — SIGNIFICANT CHANGE UP
EOSINOPHIL # BLD AUTO: 0.03 K/UL — SIGNIFICANT CHANGE UP (ref 0–0.7)
EOSINOPHIL NFR BLD AUTO: 0.4 % — SIGNIFICANT CHANGE UP (ref 0–8)
GLUCOSE SERPL-MCNC: 99 MG/DL — SIGNIFICANT CHANGE UP (ref 70–99)
HCT VFR BLD CALC: 48.4 % — SIGNIFICANT CHANGE UP (ref 42–52)
HGB BLD-MCNC: 16.8 G/DL — SIGNIFICANT CHANGE UP (ref 14–18)
IMM GRANULOCYTES NFR BLD AUTO: 0.1 % — SIGNIFICANT CHANGE UP (ref 0.1–0.3)
LYMPHOCYTES # BLD AUTO: 1.23 K/UL — SIGNIFICANT CHANGE UP (ref 1.2–3.4)
LYMPHOCYTES # BLD AUTO: 18.1 % — LOW (ref 20.5–51.1)
MCHC RBC-ENTMCNC: 28.9 PG — SIGNIFICANT CHANGE UP (ref 27–31)
MCHC RBC-ENTMCNC: 34.7 G/DL — SIGNIFICANT CHANGE UP (ref 32–37)
MCV RBC AUTO: 83.3 FL — SIGNIFICANT CHANGE UP (ref 80–94)
MONOCYTES # BLD AUTO: 0.52 K/UL — SIGNIFICANT CHANGE UP (ref 0.1–0.6)
MONOCYTES NFR BLD AUTO: 7.6 % — SIGNIFICANT CHANGE UP (ref 1.7–9.3)
NEUTROPHILS # BLD AUTO: 4.98 K/UL — SIGNIFICANT CHANGE UP (ref 1.4–6.5)
NEUTROPHILS NFR BLD AUTO: 73.2 % — SIGNIFICANT CHANGE UP (ref 42.2–75.2)
NRBC # BLD: 0 /100 WBCS — SIGNIFICANT CHANGE UP (ref 0–0)
NRBC BLD-RTO: 0 /100 WBCS — SIGNIFICANT CHANGE UP (ref 0–0)
PLATELET # BLD AUTO: 213 K/UL — SIGNIFICANT CHANGE UP (ref 130–400)
PMV BLD: 8.9 FL — SIGNIFICANT CHANGE UP (ref 7.4–10.4)
POTASSIUM SERPL-MCNC: 4.8 MMOL/L — SIGNIFICANT CHANGE UP (ref 3.5–5)
POTASSIUM SERPL-SCNC: 4.8 MMOL/L — SIGNIFICANT CHANGE UP (ref 3.5–5)
PROT SERPL-MCNC: 6.9 G/DL — SIGNIFICANT CHANGE UP (ref 6–8)
RBC # BLD: 5.81 M/UL — SIGNIFICANT CHANGE UP (ref 4.7–6.1)
RBC # FLD: 13.2 % — SIGNIFICANT CHANGE UP (ref 11.5–14.5)
SODIUM SERPL-SCNC: 137 MMOL/L — SIGNIFICANT CHANGE UP (ref 135–146)
TROPONIN T, HIGH SENSITIVITY RESULT: 8 NG/L — SIGNIFICANT CHANGE UP (ref 6–21)
TROPONIN T, HIGH SENSITIVITY RESULT: 9 NG/L — SIGNIFICANT CHANGE UP (ref 6–21)
WBC # BLD: 6.81 K/UL — SIGNIFICANT CHANGE UP (ref 4.8–10.8)
WBC # FLD AUTO: 6.81 K/UL — SIGNIFICANT CHANGE UP (ref 4.8–10.8)

## 2025-02-10 PROCEDURE — 36000 PLACE NEEDLE IN VEIN: CPT

## 2025-02-10 PROCEDURE — 93005 ELECTROCARDIOGRAM TRACING: CPT

## 2025-02-10 PROCEDURE — 84484 ASSAY OF TROPONIN QUANT: CPT

## 2025-02-10 PROCEDURE — 36415 COLL VENOUS BLD VENIPUNCTURE: CPT

## 2025-02-10 PROCEDURE — 99285 EMERGENCY DEPT VISIT HI MDM: CPT | Mod: 25

## 2025-02-10 PROCEDURE — 71045 X-RAY EXAM CHEST 1 VIEW: CPT

## 2025-02-10 PROCEDURE — 85025 COMPLETE CBC W/AUTO DIFF WBC: CPT

## 2025-02-10 PROCEDURE — 93010 ELECTROCARDIOGRAM REPORT: CPT

## 2025-02-10 PROCEDURE — 71045 X-RAY EXAM CHEST 1 VIEW: CPT | Mod: 26

## 2025-02-10 PROCEDURE — 99285 EMERGENCY DEPT VISIT HI MDM: CPT

## 2025-02-10 PROCEDURE — 80053 COMPREHEN METABOLIC PANEL: CPT

## 2025-02-10 NOTE — ED PROVIDER NOTE - CARE PROVIDER_API CALL
Bhanu Swift  Cardiology  [address], [address]  [place] [address]  Phone: [phone]  Fax: [phone]  Established Patient  Follow Up Time: 7-10 Days

## 2025-02-10 NOTE — ED PROVIDER NOTE - CLINICAL SUMMARY MEDICAL DECISION MAKING FREE TEXT BOX
59-year-old man, history of hypertension, hyperlipidemia, complains of intermittent chest tightness over the past 3 weeks without associated infectious symptoms, shortness of breath, nausea, vomiting.  No exertional component.  Vital signs, exam as noted, patient is nontoxic-appearing and comfortable.  EKG sinus bradycardia with right axis deviation.  Chest x-ray no findings.  Troponins reassuring at 8 and 9.  Spoke with patient's cardiologist Dr. Swift, will discharge with follow-up in his office next week for further workup.  Patient is comfortable with discharge plan.

## 2025-02-10 NOTE — ED PROVIDER NOTE - OBJECTIVE STATEMENT
Pt is a 59y male pmhx htn hld p/w chest tightness intermittent for the last 3 weeks. Otherwise denies any fever, chills, headache, changes in vision, cough, congestion, palpitations, sob, n/v/d, abd pain, constipation, urinary complaints, lower extremity pain/swelling.

## 2025-02-10 NOTE — ED ADULT TRIAGE NOTE - CHIEF COMPLAINT QUOTE
Pt. c/o chest pain x 3 weeks. Pt. denies SOB. Pt. states he has high cholesterol and a heart murmur.

## 2025-02-10 NOTE — ED PROVIDER NOTE - PROGRESS NOTE DETAILS
pk: case disc with Dr. Snow. all results d/w pt & copies given, strict return precautions discussed, rec outpt f.u with Dr. Angeles. Patient agreeable with plan and demonstrates understanding.

## 2025-02-10 NOTE — ED PROVIDER NOTE - NSFOLLOWUPINSTRUCTIONS_ED_ALL_ED_FT
1. FOLLOW UP WITH DR AYERS NEXT WEEK AS SCHEDULED; BRING COPIES OF YOUR LABWORK WITH YOU TO YOUR APPOINTMENT  2. CONTINUE YOUR MEDICATIONS  3. RETURN TO THE ER IMMEDIATELY FOR WORSENING SYMPTOMS    Chest Pain    Chest pain can be caused by many different conditions which may or may not be dangerous. Causes include heartburn, lung infections, heart attack, blood clot in lungs, skin infections, strain or damage to muscle, cartilage, or bones, etc. In addition to a history and physical examination, an electrocardiogram (ECG) or other lab tests may have been performed to determine the cause of your chest pain. Follow up with your primary care provider or with a cardiologist as instructed.     SEEK IMMEDIATE MEDICAL CARE IF YOU HAVE ANY OF THE FOLLOWING SYMPTOMS: worsening chest pain, coughing up blood, unexplained back/neck/jaw pain, severe abdominal pain, dizziness or lightheadedness, fainting, shortness of breath, sweaty or clammy skin, vomiting, or racing heart beat. These symptoms may represent a serious problem that is an emergency. Do not wait to see if the symptoms will go away. Get medical help right away. Call 911 and do not drive yourself to the hospital.

## 2025-02-10 NOTE — ED PROVIDER NOTE - PATIENT PORTAL LINK FT
You can access the FollowMyHealth Patient Portal offered by [facility] by registering at the following website: [email or web address]. By joining [employer]’s FollowMyHealth portal, you will also be able to view your health information using other applications (apps) compatible with our system.

## 2025-02-15 ENCOUNTER — NON-APPOINTMENT (OUTPATIENT)
Age: 60
End: 2025-02-15

## 2025-02-18 ENCOUNTER — NON-APPOINTMENT (OUTPATIENT)
Age: 60
End: 2025-02-18

## 2025-02-21 RX ORDER — TAMSULOSIN HYDROCHLORIDE 0.4 MG/1
0.4 CAPSULE ORAL
Qty: 30 | Refills: 0 | Status: ACTIVE | COMMUNITY
Start: 2025-02-21 | End: 1900-01-01

## 2025-03-19 ENCOUNTER — APPOINTMENT (OUTPATIENT)
Dept: UROLOGY | Facility: CLINIC | Age: 60
End: 2025-03-19
Payer: COMMERCIAL

## 2025-03-19 VITALS
SYSTOLIC BLOOD PRESSURE: 143 MMHG | TEMPERATURE: 98 F | BODY MASS INDEX: 24.25 KG/M2 | WEIGHT: 160 LBS | HEIGHT: 68 IN | OXYGEN SATURATION: 98 % | RESPIRATION RATE: 17 BRPM | HEART RATE: 64 BPM | DIASTOLIC BLOOD PRESSURE: 83 MMHG

## 2025-03-19 DIAGNOSIS — N20.0 CALCULUS OF KIDNEY: ICD-10-CM

## 2025-03-19 DIAGNOSIS — N23 UNSPECIFIED RENAL COLIC: ICD-10-CM

## 2025-03-19 DIAGNOSIS — N40.1 BENIGN PROSTATIC HYPERPLASIA WITH LOWER URINARY TRACT SYMPMS: ICD-10-CM

## 2025-03-19 DIAGNOSIS — N13.8 BENIGN PROSTATIC HYPERPLASIA WITH LOWER URINARY TRACT SYMPMS: ICD-10-CM

## 2025-03-19 DIAGNOSIS — N20.1 CALCULUS OF URETER: ICD-10-CM

## 2025-03-19 PROCEDURE — G2211 COMPLEX E/M VISIT ADD ON: CPT | Mod: NC

## 2025-03-19 PROCEDURE — 99214 OFFICE O/P EST MOD 30 MIN: CPT

## 2025-06-24 NOTE — PRE-ANESTHESIA EVALUATION ADULT - RESPIRATORY RATE (BREATHS/MIN)
Rad Brandt is requesting a refill on the following medication(s):  Requested Prescriptions     Pending Prescriptions Disp Refills    amLODIPine (NORVASC) 5 MG tablet [Pharmacy Med Name: amLODIPine BESYLATE 5 MG TAB] 90 tablet 1     Sig: TAKE 1 TABLET BY MOUTH DAILY    FLUoxetine (PROZAC) 10 MG capsule [Pharmacy Med Name: FLUoxetine HCL 10 MG CAPSULE] 30 capsule 0     Sig: TAKE 1 CAPSULE BY MOUTH DAILY       Last Visit Date (If Applicable):  11/12/2024    Next Visit Date:    11/13/2025   20